# Patient Record
Sex: FEMALE | Race: WHITE | NOT HISPANIC OR LATINO | Employment: FULL TIME | URBAN - METROPOLITAN AREA
[De-identification: names, ages, dates, MRNs, and addresses within clinical notes are randomized per-mention and may not be internally consistent; named-entity substitution may affect disease eponyms.]

---

## 2023-09-11 PROBLEM — M17.9 OSTEOARTHRITIS OF KNEE: Status: ACTIVE | Noted: 2023-09-11

## 2023-09-11 PROBLEM — F41.9 ANXIETY: Status: ACTIVE | Noted: 2023-09-11

## 2023-09-11 PROBLEM — R73.03 PREDIABETES: Status: ACTIVE | Noted: 2023-09-11

## 2023-09-11 PROBLEM — E03.9 HYPOTHYROIDISM: Status: ACTIVE | Noted: 2023-09-11

## 2023-09-11 PROBLEM — E78.1 PURE HYPERGLYCERIDEMIA: Status: ACTIVE | Noted: 2023-09-11

## 2023-09-11 PROBLEM — G47.00 INSOMNIA: Status: ACTIVE | Noted: 2023-09-11

## 2023-09-11 PROBLEM — J45.909 ASTHMA (HHS-HCC): Status: ACTIVE | Noted: 2023-09-11

## 2023-09-11 PROBLEM — K21.9 GASTROESOPHAGEAL REFLUX DISEASE: Status: ACTIVE | Noted: 2023-09-11

## 2023-09-11 RX ORDER — FOLIC ACID 0.8 MG
0.8 TABLET ORAL DAILY
COMMUNITY

## 2023-09-11 RX ORDER — FAMOTIDINE 20 MG/1
20 TABLET, FILM COATED ORAL DAILY
COMMUNITY

## 2023-09-11 RX ORDER — NAPROXEN SODIUM 220 MG/1
81 TABLET, FILM COATED ORAL 2 TIMES DAILY
COMMUNITY
End: 2023-12-15 | Stop reason: WASHOUT

## 2023-09-11 RX ORDER — ZOLPIDEM TARTRATE 10 MG/1
10 TABLET ORAL NIGHTLY PRN
COMMUNITY
Start: 2023-03-30 | End: 2023-10-17 | Stop reason: SDUPTHER

## 2023-09-11 RX ORDER — FLAXSEED OIL 1000 MG
CAPSULE ORAL
COMMUNITY
End: 2024-04-04 | Stop reason: WASHOUT

## 2023-09-11 RX ORDER — FLUTICASONE FUROATE AND VILANTEROL 200; 25 UG/1; UG/1
1 POWDER RESPIRATORY (INHALATION) DAILY
COMMUNITY
Start: 2017-10-10 | End: 2024-03-11

## 2023-09-11 RX ORDER — IBUPROFEN 100 MG/5ML
1000 SUSPENSION, ORAL (FINAL DOSE FORM) ORAL DAILY
COMMUNITY

## 2023-09-11 RX ORDER — LANOLIN ALCOHOL/MO/W.PET/CERES
1000 CREAM (GRAM) TOPICAL DAILY
COMMUNITY

## 2023-09-11 RX ORDER — LEVOTHYROXINE SODIUM 75 UG/1
75 TABLET ORAL
COMMUNITY
Start: 2023-03-30 | End: 2024-03-25

## 2023-09-11 RX ORDER — ESCITALOPRAM OXALATE 20 MG/1
20 TABLET ORAL DAILY
COMMUNITY
Start: 2016-12-07 | End: 2024-04-18

## 2023-10-10 ENCOUNTER — HOSPITAL ENCOUNTER (OUTPATIENT)
Dept: RADIOLOGY | Facility: HOSPITAL | Age: 58
Discharge: HOME | End: 2023-10-10
Payer: COMMERCIAL

## 2023-10-10 ENCOUNTER — LAB (OUTPATIENT)
Dept: LAB | Facility: LAB | Age: 58
End: 2023-10-10
Payer: COMMERCIAL

## 2023-10-10 DIAGNOSIS — Z72.0 TOBACCO USE: ICD-10-CM

## 2023-10-10 DIAGNOSIS — E03.9 HYPOTHYROIDISM, UNSPECIFIED: Primary | ICD-10-CM

## 2023-10-10 LAB — TSH SERPL-ACNC: 2.83 MIU/L (ref 0.44–3.98)

## 2023-10-10 PROCEDURE — 84443 ASSAY THYROID STIM HORMONE: CPT

## 2023-10-10 PROCEDURE — 71271 CT THORAX LUNG CANCER SCR C-: CPT

## 2023-10-10 PROCEDURE — 36415 COLL VENOUS BLD VENIPUNCTURE: CPT

## 2023-10-17 ENCOUNTER — TELEPHONE (OUTPATIENT)
Dept: PRIMARY CARE | Facility: CLINIC | Age: 58
End: 2023-10-17
Payer: COMMERCIAL

## 2023-10-17 DIAGNOSIS — F51.01 PRIMARY INSOMNIA: Primary | ICD-10-CM

## 2023-10-17 RX ORDER — ZOLPIDEM TARTRATE 10 MG/1
10 TABLET ORAL NIGHTLY PRN
Qty: 30 TABLET | Refills: 0 | Status: SHIPPED | OUTPATIENT
Start: 2023-10-17 | End: 2023-11-15

## 2023-10-17 NOTE — TELEPHONE ENCOUNTER
LV 4/18/23, NV 4/4/24.  Pt requesting 90-day supply zolpidem 10 mg to [facility] on [address].  Last rf: 9/15/23, dispense 30 with 0 refills.  Has only gotten 30-day supply in past but states 90-day supply costs less.

## 2023-11-15 DIAGNOSIS — F51.01 PRIMARY INSOMNIA: ICD-10-CM

## 2023-11-15 RX ORDER — ZOLPIDEM TARTRATE 10 MG/1
10 TABLET ORAL NIGHTLY PRN
Qty: 30 TABLET | Refills: 0 | Status: SHIPPED | OUTPATIENT
Start: 2023-11-15 | End: 2023-12-18

## 2023-12-14 DIAGNOSIS — F51.01 PRIMARY INSOMNIA: ICD-10-CM

## 2023-12-15 RX ORDER — DOCUSATE SODIUM 100 MG/1
100 CAPSULE, LIQUID FILLED ORAL 2 TIMES DAILY PRN
COMMUNITY
End: 2024-04-04 | Stop reason: WASHOUT

## 2023-12-15 RX ORDER — OXYCODONE HYDROCHLORIDE 5 MG/1
5 CAPSULE ORAL EVERY 6 HOURS PRN
COMMUNITY
End: 2024-04-04 | Stop reason: WASHOUT

## 2023-12-15 RX ORDER — IBUPROFEN 200 MG
200 TABLET ORAL EVERY 6 HOURS PRN
COMMUNITY

## 2023-12-18 RX ORDER — ZOLPIDEM TARTRATE 10 MG/1
10 TABLET ORAL NIGHTLY PRN
Qty: 30 TABLET | Refills: 0 | Status: SHIPPED | OUTPATIENT
Start: 2023-12-18 | End: 2024-01-15 | Stop reason: SDUPTHER

## 2024-01-15 DIAGNOSIS — F51.01 PRIMARY INSOMNIA: ICD-10-CM

## 2024-01-15 RX ORDER — ZOLPIDEM TARTRATE 10 MG/1
10 TABLET ORAL NIGHTLY PRN
Qty: 30 TABLET | Refills: 0 | Status: SHIPPED | OUTPATIENT
Start: 2024-01-15 | End: 2024-02-14

## 2024-02-20 DIAGNOSIS — F51.01 PRIMARY INSOMNIA: ICD-10-CM

## 2024-02-21 RX ORDER — ZOLPIDEM TARTRATE 10 MG/1
10 TABLET ORAL NIGHTLY PRN
Qty: 30 TABLET | Refills: 0 | Status: SHIPPED | OUTPATIENT
Start: 2024-02-21 | End: 2024-03-21

## 2024-03-11 DIAGNOSIS — J45.20 MILD INTERMITTENT ASTHMA WITHOUT COMPLICATION (HHS-HCC): Primary | ICD-10-CM

## 2024-03-11 RX ORDER — FLUTICASONE FUROATE AND VILANTEROL TRIFENATATE 200; 25 UG/1; UG/1
1 POWDER RESPIRATORY (INHALATION) DAILY
Qty: 1 EACH | Refills: 11 | Status: SHIPPED | OUTPATIENT
Start: 2024-03-11

## 2024-03-12 ENCOUNTER — TELEPHONE (OUTPATIENT)
Dept: PRIMARY CARE | Facility: CLINIC | Age: 59
End: 2024-03-12
Payer: COMMERCIAL

## 2024-03-20 DIAGNOSIS — F51.01 PRIMARY INSOMNIA: ICD-10-CM

## 2024-03-21 RX ORDER — ZOLPIDEM TARTRATE 10 MG/1
10 TABLET ORAL NIGHTLY PRN
Qty: 30 TABLET | Refills: 0 | Status: SHIPPED | OUTPATIENT
Start: 2024-03-21 | End: 2024-04-04 | Stop reason: SDUPTHER

## 2024-03-25 DIAGNOSIS — E03.9 ACQUIRED HYPOTHYROIDISM: Primary | ICD-10-CM

## 2024-03-25 DIAGNOSIS — J45.20 MILD INTERMITTENT ASTHMA, UNSPECIFIED WHETHER COMPLICATED (HHS-HCC): ICD-10-CM

## 2024-03-25 RX ORDER — ALBUTEROL SULFATE 90 UG/1
AEROSOL, METERED RESPIRATORY (INHALATION)
Qty: 25.5 G | Refills: 2 | Status: SHIPPED | OUTPATIENT
Start: 2024-03-25

## 2024-03-25 RX ORDER — LEVOTHYROXINE SODIUM 75 UG/1
75 TABLET ORAL
Qty: 90 TABLET | Refills: 3 | Status: SHIPPED | OUTPATIENT
Start: 2024-03-25

## 2024-03-31 ASSESSMENT — PROMIS GLOBAL HEALTH SCALE
RATE_QUALITY_OF_LIFE: VERY GOOD
RATE_GENERAL_HEALTH: GOOD
CARRYOUT_SOCIAL_ACTIVITIES: VERY GOOD
EMOTIONAL_PROBLEMS: OFTEN
RATE_SOCIAL_SATISFACTION: VERY GOOD
RATE_MENTAL_HEALTH: GOOD
CARRYOUT_PHYSICAL_ACTIVITIES: MOSTLY
RATE_PHYSICAL_HEALTH: GOOD
RATE_AVERAGE_FATIGUE: MODERATE
RATE_AVERAGE_PAIN: 2

## 2024-04-04 ENCOUNTER — OFFICE VISIT (OUTPATIENT)
Dept: PRIMARY CARE | Facility: CLINIC | Age: 59
End: 2024-04-04
Payer: COMMERCIAL

## 2024-04-04 ENCOUNTER — LAB (OUTPATIENT)
Dept: LAB | Facility: LAB | Age: 59
End: 2024-04-04
Payer: COMMERCIAL

## 2024-04-04 VITALS
DIASTOLIC BLOOD PRESSURE: 60 MMHG | OXYGEN SATURATION: 96 % | TEMPERATURE: 97.7 F | BODY MASS INDEX: 29.23 KG/M2 | HEIGHT: 63 IN | HEART RATE: 59 BPM | WEIGHT: 165 LBS | SYSTOLIC BLOOD PRESSURE: 128 MMHG

## 2024-04-04 DIAGNOSIS — Z11.59 NEED FOR HEPATITIS C SCREENING TEST: ICD-10-CM

## 2024-04-04 DIAGNOSIS — Z72.0 TOBACCO USE: ICD-10-CM

## 2024-04-04 DIAGNOSIS — K21.9 GASTROESOPHAGEAL REFLUX DISEASE WITHOUT ESOPHAGITIS: ICD-10-CM

## 2024-04-04 DIAGNOSIS — F51.01 PRIMARY INSOMNIA: ICD-10-CM

## 2024-04-04 DIAGNOSIS — F13.988: ICD-10-CM

## 2024-04-04 DIAGNOSIS — E55.9 VITAMIN D DEFICIENCY: ICD-10-CM

## 2024-04-04 DIAGNOSIS — D64.9 ANEMIA, UNSPECIFIED TYPE: ICD-10-CM

## 2024-04-04 DIAGNOSIS — E78.1 PURE HYPERGLYCERIDEMIA: ICD-10-CM

## 2024-04-04 DIAGNOSIS — Z00.00 ANNUAL PHYSICAL EXAM: Primary | ICD-10-CM

## 2024-04-04 DIAGNOSIS — F41.9 ANXIETY: ICD-10-CM

## 2024-04-04 DIAGNOSIS — E03.9 ACQUIRED HYPOTHYROIDISM: ICD-10-CM

## 2024-04-04 DIAGNOSIS — Z01.89 ENCOUNTER FOR ROUTINE LABORATORY TESTING: ICD-10-CM

## 2024-04-04 DIAGNOSIS — E53.8 VITAMIN B 12 DEFICIENCY: ICD-10-CM

## 2024-04-04 DIAGNOSIS — R73.03 PREDIABETES: ICD-10-CM

## 2024-04-04 DIAGNOSIS — Z12.31 ENCOUNTER FOR SCREENING MAMMOGRAM FOR BREAST CANCER: ICD-10-CM

## 2024-04-04 DIAGNOSIS — J45.20 MILD INTERMITTENT ASTHMA WITHOUT COMPLICATION (HHS-HCC): ICD-10-CM

## 2024-04-04 LAB
AMPHETAMINES UR QL SCN: ABNORMAL
BARBITURATES UR QL SCN: ABNORMAL
BZE UR QL SCN: ABNORMAL
CANNABINOIDS UR QL SCN: ABNORMAL
CREAT UR-MCNC: 74.2 MG/DL (ref 20–320)
PCP UR QL SCN: ABNORMAL

## 2024-04-04 PROCEDURE — 80358 DRUG SCREENING METHADONE: CPT

## 2024-04-04 PROCEDURE — 80373 DRUG SCREENING TRAMADOL: CPT

## 2024-04-04 PROCEDURE — 80346 BENZODIAZEPINES1-12: CPT

## 2024-04-04 PROCEDURE — 80354 DRUG SCREENING FENTANYL: CPT

## 2024-04-04 PROCEDURE — 80307 DRUG TEST PRSMV CHEM ANLYZR: CPT

## 2024-04-04 PROCEDURE — 82570 ASSAY OF URINE CREATININE: CPT

## 2024-04-04 PROCEDURE — 80349 CANNABINOIDS NATURAL: CPT

## 2024-04-04 PROCEDURE — 80368 SEDATIVE HYPNOTICS: CPT

## 2024-04-04 PROCEDURE — 80361 OPIATES 1 OR MORE: CPT

## 2024-04-04 PROCEDURE — 80365 DRUG SCREENING OXYCODONE: CPT

## 2024-04-04 PROCEDURE — 99396 PREV VISIT EST AGE 40-64: CPT | Performed by: NURSE PRACTITIONER

## 2024-04-04 RX ORDER — ZOLPIDEM TARTRATE 10 MG/1
10 TABLET ORAL NIGHTLY PRN
Qty: 90 TABLET | Refills: 0 | Status: SHIPPED | OUTPATIENT
Start: 2024-04-04

## 2024-04-04 RX ORDER — TRAZODONE HYDROCHLORIDE 50 MG/1
50 TABLET ORAL NIGHTLY PRN
Qty: 30 TABLET | Refills: 5 | Status: SHIPPED | OUTPATIENT
Start: 2024-04-04 | End: 2025-04-04

## 2024-04-04 ASSESSMENT — ENCOUNTER SYMPTOMS
POLYDIPSIA: 0
BLOOD IN STOOL: 0
PALPITATIONS: 0
COUGH: 0
CHILLS: 0
ABDOMINAL PAIN: 0
NECK PAIN: 0
CHEST TIGHTNESS: 0
FEVER: 0
HEMATURIA: 0
DYSURIA: 0
SHORTNESS OF BREATH: 0
ADENOPATHY: 0
BACK PAIN: 0
HEADACHES: 0
DIZZINESS: 0
VOMITING: 0
CONFUSION: 0
FACIAL ASYMMETRY: 0
NAUSEA: 0
DIAPHORESIS: 0
NERVOUS/ANXIOUS: 1
FLANK PAIN: 0
WOUND: 0
FATIGUE: 0
POLYPHAGIA: 0
SPEECH DIFFICULTY: 0
AGITATION: 0
BRUISES/BLEEDS EASILY: 0
SLEEP DISTURBANCE: 1
SEIZURES: 0

## 2024-04-04 ASSESSMENT — PATIENT HEALTH QUESTIONNAIRE - PHQ9
SUM OF ALL RESPONSES TO PHQ9 QUESTIONS 1 AND 2: 0
2. FEELING DOWN, DEPRESSED OR HOPELESS: NOT AT ALL
1. LITTLE INTEREST OR PLEASURE IN DOING THINGS: NOT AT ALL

## 2024-04-04 ASSESSMENT — PAIN SCALES - GENERAL: PAINLEVEL: 0-NO PAIN

## 2024-04-04 NOTE — PROGRESS NOTES
[facility]: [facility]  PHYSICAL EXAM      Sonal Tyson is a 58 y.o. female that is presenting today for Annual Physical Exam.    Assessment/Plan   Diagnoses and all orders for this visit:    Annual physical exam    Acquired hypothyroidism        -     Clinically euthyroid        -     TSH; Future        -     Continue Levothyroxine 75 mcg daily    Gastroesophageal reflux disease without esophagitis        -     Stable on H2 Blocker        -     Continue Famotidine 20 mg daily    Mild intermittent asthma without complication       -     Stable       -     Continue current medication regimen    Anxiety       -     Reports worsening related to difficulty sleeping at night       -     Continue Escitalopram 20 mg daily    Pure hyperglyceridemia  -     Comprehensive Metabolic Panel; Future  -     Lipid Panel; Future    Vitamin B 12 deficiency  -     Vitamin B12; Future  -     Continue daily OTC Vitamin B 12 supplement    Anemia, unspecified type  -     CBC and Auto Differential; Future  -     Comprehensive Metabolic Panel; Future    Need for hepatitis C screening test  -     Hepatitis C antibody; Future    Prediabetes  -     Comprehensive Metabolic Panel; Future  -     Hemoglobin A1C; Future    Encounter for routine laboratory testing  -     CBC and Auto Differential; Future  -     Comprehensive Metabolic Panel; Future  -     Lipid Panel; Future  -     Hemoglobin A1C; Future  -     Vitamin D 25-Hydroxy,Total (for eval of Vitamin D levels); Future  -     Vitamin B12; Future    Vitamin D deficiency  -     Vitamin D 25-Hydroxy,Total (for eval of Vitamin D levels); Future    Encounter for screening mammogram for breast cancer  -     BI mammo bilateral screening tomosynthesis; Future    Primary insomnia  -     CSA signed today  -     Opiate/Opioid/Benzo Prescription Compliance; Future  -     PDMP reviewed and appropriate  -     Start traZODone (Desyrel) 50 mg tablet; Take 1 tablet (50 mg) by mouth  as needed at bedtime for sleep.  -     Continue zolpidem (Ambien) 10 mg tablet; Take 1 tablet (10 mg) by mouth as needed at bedtime for sleep.  -     Follow up in 3 months to meet CS protocol  -     If no improvement with added trazodone, will refer to sleep specialist    Sedative, hypnotic or anxiolytic use, unsp w oth disorder (CMS/HCC)  -     Opiate/Opioid/Benzo Prescription Compliance; Future    Tobacco use        -     Cessation encouraged    Other orders  -     Follow Up In Primary Care - Established; Future    Subjective   Subjective  Sonal Tyson is a 58 y.o. female who presents for follow up of hypothyroidism. Current symptoms: none. Patient denies change in energy level, diarrhea, heat / cold intolerance, nervousness, palpitations, and weight changes. Symptoms have stabilized and been well-controlled.    [unfilled]     Objective  [unfilled]    Laboratory:  Lab Results       Component                Value               Date                       TSH                      2.83                10/10/2023              Assessment/Plan  Hypothyroidism.  Replacement is Levothyroxine 75 mcg daily    1. L-thyroxine per orders.  2. Recheck thyroid function tests today  3. Instructed not to take multivitamins or iron within 4 hours of taking thyroid medications.  4. Follow up in 1 year.    Vipul Tyson is an 58 y.o. female who presents for evaluation of heartburn. She denies cough, dysphagia, heartburn, hematemesis, hoarseness, midespigastric pain, and nausea.  She denies dysphagia. She has not lost weight. She denies melena, hematochezia, hematemesis, and coffee ground emesis.    Objective  [unfilled]     Assessment/Plan  Gastroesophageal Reflux Disease, Stable  Will continue H2 Blocker daily  Follow up in 3 months or sooner as needed.    Vipul Tyson is an 58 y.o. female who presents for follow up of asthma. The patient is not currently having symptoms / an exacerbation. The  "patient has been having episodes for entire life. Symptoms in previous episodes have included dyspnea, non-productive cough, and wheezing, and typically last 2 minutes. Previous episodes have been triggered by dust, infection, pollens, smoke, and strong odors. Treatments tried during prior episodes include short-acting inhaled beta-adrenergic agonists, which usually provides complete resolution of symptoms.    Objective  /60   Pulse 59   Temp 36.5 °C (97.7 °F)   Ht 1.588 m (5' 2.52\")   Wt 74.8 kg (165 lb)   SpO2 96%   BMI 29.68 kg/m²   [unfilled]     Assessment/Plan    Subjective  Sonal Tyson is a 58 y.o. female who complains of insomnia. Patient describes symptoms as difficulty falling asleep. Patient has found minimal relief with prescription sleep aid. Associated symptoms include: anxiety. Patient denies frequent nighttime urination, irritability, leg cramps, and restless legs. Symptoms have gradually worsened.    Objective  [unfilled]     Assessment/Plan  Insomnia associated with generalized anxiety.  Discussed sleep hygiene measures including regular sleep schedule, optimal sleep environment, and relaxing presleep rituals.  Avoid daytime naps.  Avoid caffeine after noon.  Avoid excess alcohol.  Avoid tobacco.  Recommended daily exercise.    Subjective  Sonal Tyson is a 58 y.o. female who presents for follow up of anxiety disorder. Current symptoms: insomnia, racing thoughts just prior to bedtime. She denies current suicidal and homicidal ideation. She complains of the following side effects from the treatment: none.     Objective  /60   Pulse 59   Temp 36.5 °C (97.7 °F)   Ht 1.588 m (5' 2.52\")   Wt 74.8 kg (165 lb)   SpO2 96%   BMI 29.68 kg/m²    General:    alert and oriented, in no acute distress  Affect/Behavior:     full facial expressions, good grooming, good insight, normal perception, normal reasoning, normal speech pattern and content, and normal thought patterns "     Assessment/Plan  Anxiety Disorder - worsening  Medications: Lexapro - add Trazodone to aid in sleep, if ineffective, will refer to sleep specialist.  Instructed patient to contact office or on-call physician promptly should condition worsen or any new symptoms appear and provided on-call telephone numbers. IF THE PATIENT HAS ANY SUICIDAL OR HOMICIDAL IDEATIONS, CALL THE OFFICE, DISCUSS WITH A SUPPORT MEMBER, OR GO TO THE ER IMMEDIATELY. Patient was agreeable with this plan.  Follow up: 3 months.          Review of Systems   Constitutional:  Negative for chills, diaphoresis, fatigue and fever.   HENT:  Negative for hearing loss and mouth sores.    Eyes:  Negative for visual disturbance.   Respiratory:  Negative for cough, chest tightness and shortness of breath.    Cardiovascular:  Negative for chest pain, palpitations and leg swelling.   Gastrointestinal:  Negative for abdominal pain, blood in stool, nausea and vomiting.   Endocrine: Negative for cold intolerance, heat intolerance, polydipsia, polyphagia and polyuria.   Genitourinary:  Negative for dysuria, flank pain and hematuria.   Musculoskeletal:  Negative for back pain and neck pain.   Skin:  Negative for rash and wound.   Allergic/Immunologic: Positive for environmental allergies. Negative for food allergies and immunocompromised state.   Neurological:  Negative for dizziness, seizures, syncope, facial asymmetry, speech difficulty and headaches.   Hematological:  Negative for adenopathy. Does not bruise/bleed easily.   Psychiatric/Behavioral:  Positive for sleep disturbance. Negative for agitation and confusion. The patient is nervous/anxious.       Objective   Vitals:    04/04/24 0804   BP: 128/60   Pulse: 59   Temp: 36.5 °C (97.7 °F)   SpO2: 96%     Body mass index is 29.68 kg/m².  Physical Exam  Vitals and nursing note reviewed.   Constitutional:       General: She is not in acute distress.     Appearance: Normal appearance. She is not ill-appearing.    HENT:      Head: Normocephalic and atraumatic.      Right Ear: Tympanic membrane, ear canal and external ear normal. There is no impacted cerumen.      Left Ear: Tympanic membrane, ear canal and external ear normal. There is no impacted cerumen.      Nose: Nose normal.      Mouth/Throat:      Mouth: Mucous membranes are moist.      Pharynx: Oropharynx is clear. No oropharyngeal exudate or posterior oropharyngeal erythema.   Eyes:      General: No scleral icterus.        Right eye: No discharge.         Left eye: No discharge.      Extraocular Movements: Extraocular movements intact.      Conjunctiva/sclera: Conjunctivae normal.      Pupils: Pupils are equal, round, and reactive to light.   Neck:      Vascular: No carotid bruit.   Cardiovascular:      Rate and Rhythm: Normal rate and regular rhythm.      Pulses: Normal pulses.      Heart sounds: Normal heart sounds. No murmur heard.  Pulmonary:      Effort: Pulmonary effort is normal. No respiratory distress.      Breath sounds: Normal breath sounds.   Abdominal:      General: Abdomen is flat. Bowel sounds are normal. There is no distension.      Palpations: Abdomen is soft. There is no mass.      Tenderness: There is no abdominal tenderness. There is no right CVA tenderness or left CVA tenderness.      Hernia: No hernia is present.   Musculoskeletal:         General: No tenderness. Normal range of motion.      Cervical back: No tenderness.      Right lower leg: No edema.      Left lower leg: No edema.   Lymphadenopathy:      Cervical: No cervical adenopathy.   Skin:     General: Skin is warm and dry.      Capillary Refill: Capillary refill takes less than 2 seconds.      Coloration: Skin is not jaundiced.      Findings: No rash.   Neurological:      General: No focal deficit present.      Mental Status: She is alert and oriented to person, place, and time. Mental status is at baseline.   Psychiatric:         Mood and Affect: Mood normal.         Behavior: Behavior  "normal.       Diagnostic Results   Lab Results   Component Value Date    GLUCOSE 144 (H) 05/10/2023    CALCIUM 8.6 05/10/2023     05/10/2023    K 4.2 05/10/2023    CO2 24 05/10/2023     05/10/2023    BUN 12 05/10/2023    CREATININE 0.7 05/10/2023     Lab Results   Component Value Date    ALT 22 03/14/2023    AST 23 03/14/2023    ALKPHOS 88 03/14/2023    BILITOT 0.3 03/14/2023     Lab Results   Component Value Date    WBC 9.9 05/10/2023    HGB 13.5 05/10/2023    HCT 40.8 05/10/2023    MCV 96.2 05/10/2023     (L) 05/10/2023     Lab Results   Component Value Date    CHOL 192 03/14/2023    CHOL 185 03/24/2022    CHOL 208 (H) 12/29/2020     Lab Results   Component Value Date    HDL 37 (L) 03/14/2023    HDL 41 (L) 03/24/2022    HDL 36 (L) 12/29/2020     Lab Results   Component Value Date    LDLCALC 99 03/14/2023    LDLCALC 105 03/24/2022    LDLCALC  12/29/2020     Unable to report calculated LDL due to increased triglycerides. Direct     Lab Results   Component Value Date    TRIG 282 (H) 03/14/2023    TRIG 194 (H) 03/24/2022    TRIG 499 (H) 12/29/2020     No components found for: \"CHOLHDL\"  Lab Results   Component Value Date    HGBA1C 5.7 03/14/2023     Other labs not included in the list above were reviewed either before or during this encounter.    History   History reviewed. No pertinent past medical history.  History reviewed. No pertinent surgical history.  Family History   Problem Relation Name Age of Onset    Melanoma Mother      Heart disease Mother      Other (Heart Stent) Mother      Other (Triple Bypass) Brother  38    Melanoma Maternal Grandfather      Colon cancer Paternal Grandmother       Social History     Socioeconomic History    Marital status:      Spouse name: Not on file    Number of children: Not on file    Years of education: Not on file    Highest education level: Not on file   Occupational History    Not on file   Tobacco Use    Smoking status: Every Day     Packs/day: 1 "     Types: Cigarettes    Smokeless tobacco: Never   Vaping Use    Vaping Use: Never used   Substance and Sexual Activity    Alcohol use: Not Currently    Drug use: Never    Sexual activity: Not on file   Other Topics Concern    Not on file   Social History Narrative    Not on file     Social Determinants of Health     Financial Resource Strain: Not on file   Food Insecurity: Not on file   Transportation Needs: Not on file   Physical Activity: Not on file   Stress: Not on file   Social Connections: Not on file   Intimate Partner Violence: Not on file   Housing Stability: Not on file     Allergies   Allergen Reactions    Penicillin Itching     Current Outpatient Medications on File Prior to Visit   Medication Sig Dispense Refill    albuterol 90 mcg/actuation inhaler INHALE 2 PUFFS BY MOUTH AS NEEDED EVERY FOUR HOURS 25.5 g 2    ascorbic acid (Vitamin C) 1,000 mg tablet Take 1 tablet (1,000 mg) by mouth once daily.      cyanocobalamin (Vitamin B-12) 1,000 mcg tablet Take 1 tablet (1,000 mcg) by mouth once daily.      escitalopram (Lexapro) 20 mg tablet Take 1 tablet (20 mg) by mouth once daily.      famotidine (Pepcid) 20 mg tablet Take 1 tablet (20 mg) by mouth once daily.      ferrous sulfate (IRON ORAL) Take 1 tablet by mouth once daily.      fluticasone furoate-vilanteroL (Breo Ellipta) 200-25 mcg/dose inhaler INHALE ONE PUFF BY MOUTH ONCE DAILY 1 each 11    folic acid (Folvite) 800 mcg tablet Take 1 tablet (0.8 mg) by mouth once daily.      ibuprofen 200 mg tablet Take 1 tablet (200 mg) by mouth every 6 hours if needed for mild pain (1 - 3).      levothyroxine (Synthroid, Levoxyl) 75 mcg tablet take 1 tablet by mouth in the morning on an empty stomach 90 tablet 3    loratadine 10 mg capsule Take 1 tablet by mouth once daily.      zolpidem (Ambien) 10 mg tablet take 1 tablet by mouth every day at bedtime as needed for sleep 30 tablet 0    [DISCONTINUED] docusate sodium (Colace) 100 mg capsule Take 1 capsule (100  mg) by mouth 2 times a day as needed for constipation.      [DISCONTINUED] flaxseed oiL 1,000 mg capsule Take by mouth.      [DISCONTINUED] oxyCODONE (Oxy-IR) 5 mg immediate release capsule Take 1 capsule (5 mg) by mouth every 6 hours if needed for severe pain (7 - 10).       No current facility-administered medications on file prior to visit.     Immunization History   Administered Date(s) Administered    Flu vaccine (IIV4), preservative free *Check age/dose* 09/30/2020    Flu vaccine, quadrivalent, no egg protein, age 6 month or greater (FLUCELVAX) 10/19/2019, 11/17/2022    Influenza, injectable, quadrivalent 11/16/2015, 12/07/2016, 10/01/2019, 10/26/2021    Influenza, seasonal, injectable 10/05/2021, 10/31/2023    Moderna COVID-19 vaccine, bivalent, blue cap/gray label *Check age/dose* 11/17/2022    Moderna SARS-CoV-2 Vaccination 05/10/2021, 06/04/2021, 01/03/2022    Pfizer Purple Cap SARS-CoV-2 10/31/2023    Tdap vaccine, age 7 year and older (BOOSTRIX, ADACEL) 04/24/2014, 10/17/2019    Zoster vaccine, recombinant, adult (SHINGRIX) 04/02/2021     Patient's medical history was reviewed and updated either before or during this encounter.       Yvrose Doe, APRN-CNP

## 2024-04-08 LAB — CARBOXYTHC UR-MCNC: 218 NG/ML

## 2024-04-09 LAB
1OH-MIDAZOLAM UR CFM-MCNC: <25 NG/ML
6MAM UR CFM-MCNC: <25 NG/ML
7AMINOCLONAZEPAM UR CFM-MCNC: <25 NG/ML
A-OH ALPRAZ UR CFM-MCNC: <25 NG/ML
ALPRAZ UR CFM-MCNC: <25 NG/ML
CHLORDIAZEP UR CFM-MCNC: <25 NG/ML
CLONAZEPAM UR CFM-MCNC: <25 NG/ML
CODEINE UR CFM-MCNC: <50 NG/ML
DIAZEPAM UR CFM-MCNC: <25 NG/ML
EDDP UR CFM-MCNC: <25 NG/ML
FENTANYL UR CFM-MCNC: <2.5 NG/ML
HYDROCODONE CTO UR CFM-MCNC: <25 NG/ML
HYDROMORPHONE UR CFM-MCNC: <25 NG/ML
LORAZEPAM UR CFM-MCNC: <25 NG/ML
METHADONE UR CFM-MCNC: <25 NG/ML
MIDAZOLAM UR CFM-MCNC: <25 NG/ML
MORPHINE UR CFM-MCNC: <50 NG/ML
NORDIAZEPAM UR CFM-MCNC: <25 NG/ML
NORFENTANYL UR CFM-MCNC: <2.5 NG/ML
NORHYDROCODONE UR CFM-MCNC: <25 NG/ML
NOROXYCODONE UR CFM-MCNC: <25 NG/ML
NORTRAMADOL UR-MCNC: <50 NG/ML
OXAZEPAM UR CFM-MCNC: <25 NG/ML
OXYCODONE UR CFM-MCNC: <25 NG/ML
OXYMORPHONE UR CFM-MCNC: <25 NG/ML
TEMAZEPAM UR CFM-MCNC: <25 NG/ML
TRAMADOL UR CFM-MCNC: <50 NG/ML
ZOLPIDEM UR CFM-MCNC: >1000 NG/ML
ZOLPIDEM UR-MCNC: <25 NG/ML

## 2024-04-17 ENCOUNTER — APPOINTMENT (OUTPATIENT)
Dept: RADIOLOGY | Facility: HOSPITAL | Age: 59
End: 2024-04-17
Payer: COMMERCIAL

## 2024-04-18 DIAGNOSIS — F41.9 ANXIETY: Primary | ICD-10-CM

## 2024-04-18 RX ORDER — ESCITALOPRAM OXALATE 20 MG/1
20 TABLET ORAL DAILY
Qty: 90 TABLET | Refills: 3 | Status: SHIPPED | OUTPATIENT
Start: 2024-04-18

## 2024-04-19 ENCOUNTER — HOSPITAL ENCOUNTER (OUTPATIENT)
Dept: RADIOLOGY | Facility: HOSPITAL | Age: 59
Discharge: HOME | End: 2024-04-19
Payer: COMMERCIAL

## 2024-04-19 VITALS — WEIGHT: 160 LBS | HEIGHT: 64 IN | BODY MASS INDEX: 27.31 KG/M2

## 2024-04-19 DIAGNOSIS — Z12.31 ENCOUNTER FOR SCREENING MAMMOGRAM FOR BREAST CANCER: ICD-10-CM

## 2024-04-19 PROCEDURE — 77063 BREAST TOMOSYNTHESIS BI: CPT | Performed by: STUDENT IN AN ORGANIZED HEALTH CARE EDUCATION/TRAINING PROGRAM

## 2024-04-19 PROCEDURE — 77067 SCR MAMMO BI INCL CAD: CPT | Performed by: STUDENT IN AN ORGANIZED HEALTH CARE EDUCATION/TRAINING PROGRAM

## 2024-04-19 PROCEDURE — 77067 SCR MAMMO BI INCL CAD: CPT

## 2024-04-24 NOTE — RESULT ENCOUNTER NOTE
Left voicemail for pt, pt advised to call office or scheduling if she has not been contacted for additional imaging to complete her mammogram exam, scheduling number provided.

## 2024-05-13 ENCOUNTER — APPOINTMENT (OUTPATIENT)
Dept: RADIOLOGY | Facility: HOSPITAL | Age: 59
End: 2024-05-13
Payer: COMMERCIAL

## 2024-05-20 ENCOUNTER — APPOINTMENT (OUTPATIENT)
Dept: RADIOLOGY | Facility: HOSPITAL | Age: 59
End: 2024-05-20
Payer: COMMERCIAL

## 2024-05-20 ENCOUNTER — HOSPITAL ENCOUNTER (OUTPATIENT)
Dept: RADIOLOGY | Facility: HOSPITAL | Age: 59
Discharge: HOME | End: 2024-05-20
Payer: COMMERCIAL

## 2024-05-20 ENCOUNTER — LAB (OUTPATIENT)
Dept: LAB | Facility: LAB | Age: 59
End: 2024-05-20
Payer: COMMERCIAL

## 2024-05-20 DIAGNOSIS — E78.1 PURE HYPERGLYCERIDEMIA: ICD-10-CM

## 2024-05-20 DIAGNOSIS — E03.9 ACQUIRED HYPOTHYROIDISM: ICD-10-CM

## 2024-05-20 DIAGNOSIS — E55.9 VITAMIN D DEFICIENCY: ICD-10-CM

## 2024-05-20 DIAGNOSIS — D64.9 ANEMIA, UNSPECIFIED TYPE: ICD-10-CM

## 2024-05-20 DIAGNOSIS — E53.8 VITAMIN B 12 DEFICIENCY: ICD-10-CM

## 2024-05-20 DIAGNOSIS — R73.03 PREDIABETES: ICD-10-CM

## 2024-05-20 DIAGNOSIS — R92.8 OTHER ABNORMAL AND INCONCLUSIVE FINDINGS ON DIAGNOSTIC IMAGING OF BREAST: ICD-10-CM

## 2024-05-20 DIAGNOSIS — Z11.59 NEED FOR HEPATITIS C SCREENING TEST: ICD-10-CM

## 2024-05-20 DIAGNOSIS — Z01.89 ENCOUNTER FOR ROUTINE LABORATORY TESTING: ICD-10-CM

## 2024-05-20 LAB
25(OH)D3 SERPL-MCNC: 87 NG/ML (ref 30–100)
ALBUMIN SERPL BCP-MCNC: 4.1 G/DL (ref 3.4–5)
ALP SERPL-CCNC: 87 U/L (ref 33–110)
ALT SERPL W P-5'-P-CCNC: 21 U/L (ref 7–45)
ANION GAP SERPL CALC-SCNC: 12 MMOL/L (ref 10–20)
AST SERPL W P-5'-P-CCNC: 25 U/L (ref 9–39)
BASOPHILS # BLD AUTO: 0.02 X10*3/UL (ref 0–0.1)
BASOPHILS NFR BLD AUTO: 0.4 %
BILIRUB SERPL-MCNC: 0.5 MG/DL (ref 0–1.2)
BUN SERPL-MCNC: 12 MG/DL (ref 6–23)
CALCIUM SERPL-MCNC: 9.3 MG/DL (ref 8.6–10.3)
CHLORIDE SERPL-SCNC: 105 MMOL/L (ref 98–107)
CHOLEST SERPL-MCNC: 154 MG/DL (ref 0–199)
CHOLESTEROL/HDL RATIO: 3.6
CO2 SERPL-SCNC: 28 MMOL/L (ref 21–32)
CREAT SERPL-MCNC: 0.63 MG/DL (ref 0.5–1.05)
EGFRCR SERPLBLD CKD-EPI 2021: >90 ML/MIN/1.73M*2
EOSINOPHIL # BLD AUTO: 0.36 X10*3/UL (ref 0–0.7)
EOSINOPHIL NFR BLD AUTO: 6.8 %
ERYTHROCYTE [DISTWIDTH] IN BLOOD BY AUTOMATED COUNT: 13.9 % (ref 11.5–14.5)
EST. AVERAGE GLUCOSE BLD GHB EST-MCNC: 111 MG/DL
GLUCOSE SERPL-MCNC: 96 MG/DL (ref 74–99)
HBA1C MFR BLD: 5.5 %
HCT VFR BLD AUTO: 47.3 % (ref 36–46)
HCV AB SER QL: NONREACTIVE
HDLC SERPL-MCNC: 42.2 MG/DL
HGB BLD-MCNC: 15.3 G/DL (ref 12–16)
IMM GRANULOCYTES # BLD AUTO: 0.01 X10*3/UL (ref 0–0.7)
IMM GRANULOCYTES NFR BLD AUTO: 0.2 % (ref 0–0.9)
LDLC SERPL CALC-MCNC: 89 MG/DL
LYMPHOCYTES # BLD AUTO: 1.61 X10*3/UL (ref 1.2–4.8)
LYMPHOCYTES NFR BLD AUTO: 30.4 %
MCH RBC QN AUTO: 31.2 PG (ref 26–34)
MCHC RBC AUTO-ENTMCNC: 32.3 G/DL (ref 32–36)
MCV RBC AUTO: 96 FL (ref 80–100)
MONOCYTES # BLD AUTO: 0.43 X10*3/UL (ref 0.1–1)
MONOCYTES NFR BLD AUTO: 8.1 %
NEUTROPHILS # BLD AUTO: 2.86 X10*3/UL (ref 1.2–7.7)
NEUTROPHILS NFR BLD AUTO: 54.1 %
NON HDL CHOLESTEROL: 112 MG/DL (ref 0–149)
NRBC BLD-RTO: 0 /100 WBCS (ref 0–0)
PLATELET # BLD AUTO: 158 X10*3/UL (ref 150–450)
POTASSIUM SERPL-SCNC: 3.4 MMOL/L (ref 3.5–5.3)
PROT SERPL-MCNC: 7.2 G/DL (ref 6.4–8.2)
RBC # BLD AUTO: 4.91 X10*6/UL (ref 4–5.2)
SODIUM SERPL-SCNC: 142 MMOL/L (ref 136–145)
TRIGL SERPL-MCNC: 116 MG/DL (ref 0–149)
TSH SERPL-ACNC: 2.02 MIU/L (ref 0.44–3.98)
VIT B12 SERPL-MCNC: 710 PG/ML (ref 211–911)
VLDL: 23 MG/DL (ref 0–40)
WBC # BLD AUTO: 5.3 X10*3/UL (ref 4.4–11.3)

## 2024-05-20 PROCEDURE — 86803 HEPATITIS C AB TEST: CPT

## 2024-05-20 PROCEDURE — 77061 BREAST TOMOSYNTHESIS UNI: CPT | Mod: LEFT SIDE | Performed by: RADIOLOGY

## 2024-05-20 PROCEDURE — 83036 HEMOGLOBIN GLYCOSYLATED A1C: CPT

## 2024-05-20 PROCEDURE — 82607 VITAMIN B-12: CPT

## 2024-05-20 PROCEDURE — 36415 COLL VENOUS BLD VENIPUNCTURE: CPT

## 2024-05-20 PROCEDURE — 77065 DX MAMMO INCL CAD UNI: CPT | Mod: LEFT SIDE | Performed by: RADIOLOGY

## 2024-05-20 PROCEDURE — 84443 ASSAY THYROID STIM HORMONE: CPT

## 2024-05-20 PROCEDURE — 85025 COMPLETE CBC W/AUTO DIFF WBC: CPT

## 2024-05-20 PROCEDURE — 80053 COMPREHEN METABOLIC PANEL: CPT

## 2024-05-20 PROCEDURE — 82306 VITAMIN D 25 HYDROXY: CPT

## 2024-05-20 PROCEDURE — 80061 LIPID PANEL: CPT

## 2024-05-20 PROCEDURE — 77061 BREAST TOMOSYNTHESIS UNI: CPT | Mod: LT

## 2024-05-23 ENCOUNTER — APPOINTMENT (OUTPATIENT)
Dept: RADIOLOGY | Facility: HOSPITAL | Age: 59
End: 2024-05-23
Payer: COMMERCIAL

## 2024-07-03 ENCOUNTER — OFFICE VISIT (OUTPATIENT)
Dept: PRIMARY CARE | Facility: CLINIC | Age: 59
End: 2024-07-03
Payer: COMMERCIAL

## 2024-07-03 VITALS
DIASTOLIC BLOOD PRESSURE: 86 MMHG | HEIGHT: 64 IN | HEART RATE: 57 BPM | BODY MASS INDEX: 27.14 KG/M2 | WEIGHT: 159 LBS | SYSTOLIC BLOOD PRESSURE: 136 MMHG | TEMPERATURE: 97.5 F | OXYGEN SATURATION: 98 %

## 2024-07-03 DIAGNOSIS — L98.8: ICD-10-CM

## 2024-07-03 DIAGNOSIS — F51.01 PRIMARY INSOMNIA: Primary | ICD-10-CM

## 2024-07-03 DIAGNOSIS — J45.20 MILD INTERMITTENT ASTHMA WITHOUT COMPLICATION (HHS-HCC): ICD-10-CM

## 2024-07-03 PROCEDURE — 99214 OFFICE O/P EST MOD 30 MIN: CPT | Performed by: NURSE PRACTITIONER

## 2024-07-03 RX ORDER — FLUTICASONE FUROATE AND VILANTEROL 200; 25 UG/1; UG/1
1 POWDER RESPIRATORY (INHALATION) DAILY
Qty: 3 EACH | Refills: 11 | Status: SHIPPED | OUTPATIENT
Start: 2024-07-03

## 2024-07-03 RX ORDER — ZOLPIDEM TARTRATE 10 MG/1
10 TABLET ORAL NIGHTLY PRN
Qty: 90 TABLET | Refills: 0 | Status: SHIPPED | OUTPATIENT
Start: 2024-07-03

## 2024-07-03 ASSESSMENT — ENCOUNTER SYMPTOMS
FEVER: 0
DIAPHORESIS: 0
DYSPHORIC MOOD: 0
NAUSEA: 0
OCCASIONAL FEELINGS OF UNSTEADINESS: 0
CHILLS: 0
VOMITING: 0
DEPRESSION: 0
LOSS OF SENSATION IN FEET: 0
SLEEP DISTURBANCE: 1
FATIGUE: 0
AGITATION: 0
CONFUSION: 0

## 2024-07-03 ASSESSMENT — LIFESTYLE VARIABLES
HOW OFTEN DURING THE LAST YEAR HAVE YOU NEEDED AN ALCOHOLIC DRINK FIRST THING IN THE MORNING TO GET YOURSELF GOING AFTER A NIGHT OF HEAVY DRINKING: NEVER
HOW OFTEN DURING THE LAST YEAR HAVE YOU HAD A FEELING OF GUILT OR REMORSE AFTER DRINKING: NEVER
HAS A RELATIVE, FRIEND, DOCTOR, OR ANOTHER HEALTH PROFESSIONAL EXPRESSED CONCERN ABOUT YOUR DRINKING OR SUGGESTED YOU CUT DOWN: NO
HAVE YOU OR SOMEONE ELSE BEEN INJURED AS A RESULT OF YOUR DRINKING: NO
AUDIT-C TOTAL SCORE: 0
HOW OFTEN DURING THE LAST YEAR HAVE YOU FAILED TO DO WHAT WAS NORMALLY EXPECTED FROM YOU BECAUSE OF DRINKING: NEVER
AUDIT TOTAL SCORE: 0
HOW OFTEN DURING THE LAST YEAR HAVE YOU FOUND THAT YOU WERE NOT ABLE TO STOP DRINKING ONCE YOU HAD STARTED: NEVER
HOW OFTEN DO YOU HAVE SIX OR MORE DRINKS ON ONE OCCASION: NEVER
HOW OFTEN DO YOU HAVE A DRINK CONTAINING ALCOHOL: NEVER
HOW OFTEN DURING THE LAST YEAR HAVE YOU BEEN UNABLE TO REMEMBER WHAT HAPPENED THE NIGHT BEFORE BECAUSE YOU HAD BEEN DRINKING: NEVER
HOW MANY STANDARD DRINKS CONTAINING ALCOHOL DO YOU HAVE ON A TYPICAL DAY: PATIENT DOES NOT DRINK
SKIP TO QUESTIONS 9-10: 1

## 2024-07-03 ASSESSMENT — PATIENT HEALTH QUESTIONNAIRE - PHQ9
1. LITTLE INTEREST OR PLEASURE IN DOING THINGS: NOT AT ALL
2. FEELING DOWN, DEPRESSED OR HOPELESS: NOT AT ALL
SUM OF ALL RESPONSES TO PHQ9 QUESTIONS 1 AND 2: 0

## 2024-07-03 ASSESSMENT — PAIN SCALES - GENERAL: PAINLEVEL: 0-NO PAIN

## 2024-07-03 NOTE — TELEPHONE ENCOUNTER
----- Message from Sonal Tyson sent at 7/3/2024 11:39 AM EDT -----  Regarding:  CHERI TANNER   Contact: [phone]  Gurwinder Frances,  Would you please send in a new prescription that indicates 90 day supply?   $35 for 30 day, $70 for 90 day supply.   Thank you!

## 2024-07-03 NOTE — PROGRESS NOTES
[facility]: [facility]  PROGRESS NOTE      Sonal Tyson is a 59 y.o. female that is presenting today for Controlled Substance Protocol.    Ms. Tyson is currently using Zolpidem Tartrate 10 mg nightly to manage insomnia. She reports efficacy consistent.  PDMP reviewed and appropriate. Last fill on 04/20/24 for 90 days supply. CSA signed 04/04/24. Urine toxicology passed 04/04/24.    She did not find Trazodone helpful. She stopped taking it. She provided data from her Apple Watch that details her sleep patterns. She never seems to go into deep sleep, REM sleep is minimal.  She does wake a few times nightly for a short period.  We discussed the abnormal patterns and will refer patient to sleep specialist, Dr. Whalen, to further evaluate and potentially manage her insomnia.    Assessment/Plan   Diagnoses and all orders for this visit:    Primary insomnia  -     CSA and Urine toxicology up to date  -     PDMP reviewed and appropriate  -     zolpidem (Ambien) 10 mg tablet; Take 1 tablet (10 mg) by mouth as needed at bedtime for sleep.  -     Referral to Adult Sleep Medicine; Future - Dr. Whalen  -     Follow up in 3 months to meet [facility] protocol    Vascular skin disease  -     Referral to Vascular Medicine; Future - DERRICK Zhang    Other orders  -     Follow Up In Primary Care - Established  -     Follow Up In Primary Care - Established; Future    Subjective   HPI  Review of Systems   Constitutional:  Negative for chills, diaphoresis, fatigue and fever.   Gastrointestinal:  Negative for nausea and vomiting.   Psychiatric/Behavioral:  Positive for sleep disturbance. Negative for agitation, confusion and dysphoric mood.       Objective   Vitals:    07/03/24 0830   BP: 136/86   Pulse: 57   Temp: 36.4 °C (97.5 °F)   SpO2: 98%      Body mass index is 27.29 kg/m².  Physical Exam  Vitals and nursing note reviewed.   Constitutional:       General: She is not in acute  "distress.  Cardiovascular:      Rate and Rhythm: Normal rate and regular rhythm.      Heart sounds: Normal heart sounds.   Pulmonary:      Effort: Pulmonary effort is normal.      Breath sounds: Normal breath sounds.   Skin:     General: Skin is warm and dry.      Comments: Vascular prominence to right lateral thigh and left anterior thigh, not painful.   Neurological:      Mental Status: She is alert. Mental status is at baseline.   Psychiatric:         Mood and Affect: Mood normal.       Diagnostic Results   Lab Results   Component Value Date    GLUCOSE 96 05/20/2024    CALCIUM 9.3 05/20/2024     05/20/2024    K 3.4 (L) 05/20/2024    CO2 28 05/20/2024     05/20/2024    BUN 12 05/20/2024    CREATININE 0.63 05/20/2024     Lab Results   Component Value Date    ALT 21 05/20/2024    AST 25 05/20/2024    ALKPHOS 87 05/20/2024    BILITOT 0.5 05/20/2024     Lab Results   Component Value Date    WBC 5.3 05/20/2024    HGB 15.3 05/20/2024    HCT 47.3 (H) 05/20/2024    MCV 96 05/20/2024     05/20/2024     Lab Results   Component Value Date    CHOL 154 05/20/2024    CHOL 192 03/14/2023    CHOL 185 03/24/2022     Lab Results   Component Value Date    HDL 42.2 05/20/2024    HDL 37 (L) 03/14/2023    HDL 41 (L) 03/24/2022     Lab Results   Component Value Date    LDLCALC 89 05/20/2024    LDLCALC 99 03/14/2023    LDLCALC 105 03/24/2022     Lab Results   Component Value Date    TRIG 116 05/20/2024    TRIG 282 (H) 03/14/2023    TRIG 194 (H) 03/24/2022     No components found for: \"CHOLHDL\"  Lab Results   Component Value Date    HGBA1C 5.5 05/20/2024     Other labs not included in the list above were reviewed either before or during this encounter.    History    History reviewed. No pertinent past medical history.  History reviewed. No pertinent surgical history.  Family History   Problem Relation Name Age of Onset    Melanoma Mother      Heart disease Mother      Other (Heart Stent) Mother      Other (Triple " Bypass) Brother  38    Melanoma Maternal Grandfather      Colon cancer Paternal Grandmother       Social History     Socioeconomic History    Marital status:      Spouse name: Not on file    Number of children: Not on file    Years of education: Not on file    Highest education level: Not on file   Occupational History    Not on file   Tobacco Use    Smoking status: Every Day     Current packs/day: 1.00     Types: Cigarettes    Smokeless tobacco: Never   Vaping Use    Vaping status: Never Used   Substance and Sexual Activity    Alcohol use: Not Currently    Drug use: Never    Sexual activity: Not on file   Other Topics Concern    Not on file   Social History Narrative    Not on file     Social Determinants of Health     Financial Resource Strain: Not on file   Food Insecurity: Not on file   Transportation Needs: Not on file   Physical Activity: Not on file   Stress: Not on file   Social Connections: Not on file   Intimate Partner Violence: Not on file   Housing Stability: Not on file     Allergies   Allergen Reactions    Penicillin Itching     Current Outpatient Medications on File Prior to Visit   Medication Sig Dispense Refill    albuterol 90 mcg/actuation inhaler INHALE 2 PUFFS BY MOUTH AS NEEDED EVERY FOUR HOURS 25.5 g 2    ascorbic acid (Vitamin C) 1,000 mg tablet Take 1 tablet (1,000 mg) by mouth once daily.      cyanocobalamin (Vitamin B-12) 1,000 mcg tablet Take 1 tablet (1,000 mcg) by mouth once daily.      escitalopram (Lexapro) 20 mg tablet take 1 tablet by mouth once a day 90 tablet 3    famotidine (Pepcid) 20 mg tablet Take 1 tablet (20 mg) by mouth once daily.      ferrous sulfate (IRON ORAL) Take 1 tablet by mouth once daily.      fluticasone furoate-vilanteroL (Breo Ellipta) 200-25 mcg/dose inhaler INHALE ONE PUFF BY MOUTH ONCE DAILY 1 each 11    folic acid (Folvite) 800 mcg tablet Take 1 tablet (0.8 mg) by mouth once daily.      ibuprofen 200 mg tablet Take 1 tablet (200 mg) by mouth every 6  hours if needed for mild pain (1 - 3).      levothyroxine (Synthroid, Levoxyl) 75 mcg tablet take 1 tablet by mouth in the morning on an empty stomach 90 tablet 3    loratadine 10 mg capsule Take 1 tablet by mouth once daily.      zolpidem (Ambien) 10 mg tablet Take 1 tablet (10 mg) by mouth as needed at bedtime for sleep. 90 tablet 0    [DISCONTINUED] traZODone (Desyrel) 50 mg tablet Take 1 tablet (50 mg) by mouth as needed at bedtime for sleep. 30 tablet 5     No current facility-administered medications on file prior to visit.     Immunization History   Administered Date(s) Administered    Flu vaccine (IIV4), preservative free *Check age/dose* 09/30/2020    Flu vaccine, quadrivalent, no egg protein, age 6 month or greater (FLUCELVAX) 10/19/2019, 11/17/2022    Influenza, injectable, quadrivalent 11/16/2015, 12/07/2016, 10/01/2019, 10/26/2021    Influenza, seasonal, injectable 10/05/2021, 10/31/2023    Moderna COVID-19 vaccine, bivalent, blue cap/gray label *Check age/dose* 11/17/2022    Moderna SARS-CoV-2 Vaccination 05/10/2021, 06/04/2021, 01/03/2022    Pfizer Purple Cap SARS-CoV-2 10/31/2023    Tdap vaccine, age 7 year and older (BOOSTRIX, ADACEL) 04/24/2014, 10/17/2019    Zoster vaccine, recombinant, adult (SHINGRIX) 04/02/2021     Patient's medical history was reviewed and updated either before or during this encounter.       Yvrose Doe, APRN-CNP

## 2024-07-03 NOTE — TELEPHONE ENCOUNTER
----- Message from Sonal Tyson sent at 7/3/2024 11:39 AM EDT -----  Regarding:  CHERI TANNER   Contact: [phone]  Gurwinder Frances,  Would you please send in a new prescription that indicates 90 day supply?   $35 for 30 day, $70 for 90 day supply.   Thank you!

## 2024-08-08 ENCOUNTER — HOSPITAL ENCOUNTER (OUTPATIENT)
Dept: RADIOLOGY | Facility: HOSPITAL | Age: 59
Discharge: HOME | End: 2024-08-08
Payer: COMMERCIAL

## 2024-08-08 DIAGNOSIS — M16.12 UNILATERAL PRIMARY OSTEOARTHRITIS, LEFT HIP: ICD-10-CM

## 2024-08-08 PROCEDURE — 77002 NEEDLE LOCALIZATION BY XRAY: CPT | Mod: LT

## 2024-08-08 PROCEDURE — 2550000001 HC RX 255 CONTRASTS: Performed by: ORTHOPAEDIC SURGERY

## 2024-08-08 PROCEDURE — 2500000005 HC RX 250 GENERAL PHARMACY W/O HCPCS: Performed by: ORTHOPAEDIC SURGERY

## 2024-08-08 PROCEDURE — 2500000004 HC RX 250 GENERAL PHARMACY W/ HCPCS (ALT 636 FOR OP/ED): Performed by: ORTHOPAEDIC SURGERY

## 2024-08-08 PROCEDURE — 20610 DRAIN/INJ JOINT/BURSA W/O US: CPT | Mod: LEFT SIDE | Performed by: RADIOLOGY

## 2024-08-08 PROCEDURE — 77002 NEEDLE LOCALIZATION BY XRAY: CPT | Mod: LEFT SIDE | Performed by: RADIOLOGY

## 2024-08-08 RX ORDER — LIDOCAINE HYDROCHLORIDE 10 MG/ML
5 INJECTION, SOLUTION EPIDURAL; INFILTRATION; INTRACAUDAL; PERINEURAL ONCE
Status: COMPLETED | OUTPATIENT
Start: 2024-08-08 | End: 2024-08-08

## 2024-08-08 RX ORDER — METHYLPREDNISOLONE ACETATE 40 MG/ML
40 INJECTION, SUSPENSION INTRA-ARTICULAR; INTRALESIONAL; INTRAMUSCULAR; SOFT TISSUE ONCE
Status: COMPLETED | OUTPATIENT
Start: 2024-08-08 | End: 2024-08-08

## 2024-08-08 RX ORDER — LIDOCAINE HYDROCHLORIDE 10 MG/ML
3 INJECTION, SOLUTION EPIDURAL; INFILTRATION; INTRACAUDAL; PERINEURAL ONCE
Status: COMPLETED | OUTPATIENT
Start: 2024-08-08 | End: 2024-08-08

## 2024-10-03 ENCOUNTER — OFFICE VISIT (OUTPATIENT)
Dept: PRIMARY CARE | Facility: CLINIC | Age: 59
End: 2024-10-03
Payer: COMMERCIAL

## 2024-10-03 VITALS
WEIGHT: 150 LBS | BODY MASS INDEX: 25.61 KG/M2 | DIASTOLIC BLOOD PRESSURE: 76 MMHG | OXYGEN SATURATION: 98 % | SYSTOLIC BLOOD PRESSURE: 114 MMHG | HEART RATE: 58 BPM | HEIGHT: 64 IN | TEMPERATURE: 96.6 F

## 2024-10-03 DIAGNOSIS — F51.01 PRIMARY INSOMNIA: Primary | ICD-10-CM

## 2024-10-03 DIAGNOSIS — Z23 ENCOUNTER FOR IMMUNIZATION: ICD-10-CM

## 2024-10-03 RX ORDER — ZOLPIDEM TARTRATE 10 MG/1
10 TABLET ORAL NIGHTLY PRN
Qty: 90 TABLET | Refills: 0 | Status: SHIPPED | OUTPATIENT
Start: 2024-10-03

## 2024-10-03 ASSESSMENT — ENCOUNTER SYMPTOMS
LOSS OF SENSATION IN FEET: 0
DIAPHORESIS: 0
CONFUSION: 0
VOMITING: 0
DEPRESSION: 0
CHILLS: 0
NAUSEA: 0
OCCASIONAL FEELINGS OF UNSTEADINESS: 0
FEVER: 0
AGITATION: 0
SLEEP DISTURBANCE: 1
FATIGUE: 0

## 2024-10-03 ASSESSMENT — PATIENT HEALTH QUESTIONNAIRE - PHQ9
2. FEELING DOWN, DEPRESSED OR HOPELESS: NOT AT ALL
1. LITTLE INTEREST OR PLEASURE IN DOING THINGS: NOT AT ALL
SUM OF ALL RESPONSES TO PHQ9 QUESTIONS 1 AND 2: 0

## 2024-10-03 ASSESSMENT — LIFESTYLE VARIABLES
HOW OFTEN DURING THE LAST YEAR HAVE YOU FOUND THAT YOU WERE NOT ABLE TO STOP DRINKING ONCE YOU HAD STARTED: NEVER
HOW OFTEN DO YOU HAVE SIX OR MORE DRINKS ON ONE OCCASION: NEVER
HOW OFTEN DURING THE LAST YEAR HAVE YOU FAILED TO DO WHAT WAS NORMALLY EXPECTED FROM YOU BECAUSE OF DRINKING: NEVER
AUDIT-C TOTAL SCORE: 0
HOW MANY STANDARD DRINKS CONTAINING ALCOHOL DO YOU HAVE ON A TYPICAL DAY: PATIENT DOES NOT DRINK
HOW OFTEN DURING THE LAST YEAR HAVE YOU BEEN UNABLE TO REMEMBER WHAT HAPPENED THE NIGHT BEFORE BECAUSE YOU HAD BEEN DRINKING: NEVER
HOW OFTEN DO YOU HAVE A DRINK CONTAINING ALCOHOL: NEVER
HOW OFTEN DURING THE LAST YEAR HAVE YOU HAD A FEELING OF GUILT OR REMORSE AFTER DRINKING: NEVER
HAS A RELATIVE, FRIEND, DOCTOR, OR ANOTHER HEALTH PROFESSIONAL EXPRESSED CONCERN ABOUT YOUR DRINKING OR SUGGESTED YOU CUT DOWN: NO
AUDIT TOTAL SCORE: 0
SKIP TO QUESTIONS 9-10: 1
HAVE YOU OR SOMEONE ELSE BEEN INJURED AS A RESULT OF YOUR DRINKING: NO
HOW OFTEN DURING THE LAST YEAR HAVE YOU NEEDED AN ALCOHOLIC DRINK FIRST THING IN THE MORNING TO GET YOURSELF GOING AFTER A NIGHT OF HEAVY DRINKING: NEVER

## 2024-10-03 ASSESSMENT — PAIN SCALES - GENERAL: PAINLEVEL: 0-NO PAIN

## 2024-10-03 NOTE — PROGRESS NOTES
[facility]: [facility]  PROGRESS NOTE      Sonal Tyson is a 59 y.o. female that is presenting today to meet  controlled substance protocol.    She is requesting to have annual influenza immunization today.    She reports she has a nightly routine to sleep and it has been very effective.    Assessment/Plan   Diagnoses and all orders for this visit:    Primary insomnia  -     zolpidem (Ambien) 10 mg tablet; Take 1 tablet (10 mg) by mouth as needed at bedtime for sleep.    Encounter for immunization  -     Flu vaccine, trivalent, preservative free, age 6 months and greater (Fluarix/Fluzone/Flulaval)    Other orders  -     Follow Up In Primary Care - Established    Subjective   - Today's appointment is to keep the patient in compliance with their controlled substance agreement (CSA).   - The patient has already signed their CSA on 04/04/24.  - The patient has successfully completed their urine drug screen (UDS) this year on 04/04/24.  - I have considered the risks of abuse, dependence, addiction, and/or diversion and have discussed these with the patient during our appointment.  - I do believe that the medication(s) are medically necessary. Patient has tried multiple alternatives to controlled substances in the past with limited success.  - The patient's symptoms are well-controlled on current therapy. No need for changes at this time.  - PDMP reviewed and appropriate with fill of 90 day Zolpidem on 07/18/24.        Review of Systems   Constitutional:  Negative for chills, diaphoresis, fatigue and fever.   Gastrointestinal:  Negative for nausea and vomiting.   Psychiatric/Behavioral:  Positive for sleep disturbance. Negative for agitation and confusion.       Objective   Vitals:    10/03/24 0825   BP: 114/76   Pulse: 58   Temp: 35.9 °C (96.6 °F)   SpO2: 98%      Body mass index is 25.75 kg/m².  Physical Exam  Constitutional:       General: She is not in acute distress.     Appearance: She  "is not ill-appearing.   Neurological:      General: No focal deficit present.      Mental Status: She is alert and oriented to person, place, and time.   Psychiatric:         Mood and Affect: Mood normal.         Behavior: Behavior normal.       Diagnostic Results   Lab Results   Component Value Date    GLUCOSE 96 05/20/2024    CALCIUM 9.3 05/20/2024     05/20/2024    K 3.4 (L) 05/20/2024    CO2 28 05/20/2024     05/20/2024    BUN 12 05/20/2024    CREATININE 0.63 05/20/2024     Lab Results   Component Value Date    ALT 21 05/20/2024    AST 25 05/20/2024    ALKPHOS 87 05/20/2024    BILITOT 0.5 05/20/2024     Lab Results   Component Value Date    WBC 5.3 05/20/2024    HGB 15.3 05/20/2024    HCT 47.3 (H) 05/20/2024    MCV 96 05/20/2024     05/20/2024     Lab Results   Component Value Date    CHOL 154 05/20/2024    CHOL 192 03/14/2023    CHOL 185 03/24/2022     Lab Results   Component Value Date    HDL 42.2 05/20/2024    HDL 37 (L) 03/14/2023    HDL 41 (L) 03/24/2022     Lab Results   Component Value Date    LDLCALC 89 05/20/2024    LDLCALC 99 03/14/2023    LDLCALC 105 03/24/2022     Lab Results   Component Value Date    TRIG 116 05/20/2024    TRIG 282 (H) 03/14/2023    TRIG 194 (H) 03/24/2022     No components found for: \"CHOLHDL\"  Lab Results   Component Value Date    HGBA1C 5.5 05/20/2024     Other labs not included in the list above were reviewed either before or during this encounter.    History    History reviewed. No pertinent past medical history.  History reviewed. No pertinent surgical history.  Family History   Problem Relation Name Age of Onset    Melanoma Mother      Heart disease Mother      Other (Heart Stent) Mother      Other (Triple Bypass) Brother  38    Melanoma Maternal Grandfather      Colon cancer Paternal Grandmother       Social History     Socioeconomic History    Marital status:      Spouse name: Not on file    Number of children: Not on file    Years of education: " Not on file    Highest education level: Not on file   Occupational History    Not on file   Tobacco Use    Smoking status: Every Day     Current packs/day: 1.00     Types: Cigarettes    Smokeless tobacco: Never   Vaping Use    Vaping status: Never Used   Substance and Sexual Activity    Alcohol use: Not Currently    Drug use: Never    Sexual activity: Not on file   Other Topics Concern    Not on file   Social History Narrative    Not on file     Social Determinants of Health     Financial Resource Strain: Not on file   Food Insecurity: Not on file   Transportation Needs: Not on file   Physical Activity: Not on file   Stress: Not on file   Social Connections: Not on file   Intimate Partner Violence: Not on file   Housing Stability: Not on file     Allergies   Allergen Reactions    Penicillin Itching     Current Outpatient Medications on File Prior to Visit   Medication Sig Dispense Refill    albuterol 90 mcg/actuation inhaler INHALE 2 PUFFS BY MOUTH AS NEEDED EVERY FOUR HOURS 25.5 g 2    ascorbic acid (Vitamin C) 1,000 mg tablet Take 1 tablet (1,000 mg) by mouth once daily.      cyanocobalamin (Vitamin B-12) 1,000 mcg tablet Take 1 tablet (1,000 mcg) by mouth once daily.      escitalopram (Lexapro) 20 mg tablet take 1 tablet by mouth once a day 90 tablet 3    famotidine (Pepcid) 20 mg tablet Take 1 tablet (20 mg) by mouth once daily.      ferrous sulfate (IRON ORAL) Take 1 tablet by mouth once daily.      fluticasone furoate-vilanteroL (Breo Ellipta) 200-25 mcg/dose inhaler Inhale 1 puff once daily. 3 each 11    folic acid (Folvite) 800 mcg tablet Take 1 tablet (0.8 mg) by mouth once daily.      ibuprofen 200 mg tablet Take 1 tablet (200 mg) by mouth every 6 hours if needed for mild pain (1 - 3).      levothyroxine (Synthroid, Levoxyl) 75 mcg tablet take 1 tablet by mouth in the morning on an empty stomach 90 tablet 3    loratadine 10 mg capsule Take 1 tablet by mouth once daily.      zolpidem (Ambien) 10 mg tablet  Take 1 tablet (10 mg) by mouth as needed at bedtime for sleep. 90 tablet 0     No current facility-administered medications on file prior to visit.     Immunization History   Administered Date(s) Administered    Flu vaccine (IIV4), preservative free *Check age/dose* 09/30/2020    Flu vaccine, quadrivalent, no egg protein, age 6 month or greater (FLUCELVAX) 10/19/2019, 11/17/2022    Influenza, injectable, quadrivalent 11/16/2015, 12/07/2016, 10/01/2019, 10/26/2021    Influenza, seasonal, injectable 10/05/2021, 10/31/2023    Moderna COVID-19 vaccine, bivalent, blue cap/gray label *Check age/dose* 11/17/2022    Moderna SARS-CoV-2 Vaccination 05/10/2021, 06/04/2021, 01/03/2022    Pfizer Purple Cap SARS-CoV-2 10/31/2023    Tdap vaccine, age 7 year and older (BOOSTRIX, ADACEL) 04/24/2014, 10/17/2019    Zoster vaccine, recombinant, adult (SHINGRIX) 04/02/2021     Patient's medical history was reviewed and updated either before or during this encounter.       Yvrose Doe, APRN-CNP

## 2024-10-24 ENCOUNTER — OFFICE VISIT (OUTPATIENT)
Dept: VASCULAR SURGERY | Facility: CLINIC | Age: 59
End: 2024-10-24
Payer: COMMERCIAL

## 2024-10-24 VITALS
DIASTOLIC BLOOD PRESSURE: 94 MMHG | BODY MASS INDEX: 25.75 KG/M2 | WEIGHT: 150 LBS | HEART RATE: 60 BPM | SYSTOLIC BLOOD PRESSURE: 163 MMHG | OXYGEN SATURATION: 96 %

## 2024-10-24 DIAGNOSIS — L81.9 DISCOLORATION OF SKIN: ICD-10-CM

## 2024-10-24 DIAGNOSIS — I73.9 PAD (PERIPHERAL ARTERY DISEASE) (CMS-HCC): Primary | ICD-10-CM

## 2024-10-24 DIAGNOSIS — Z72.0 TOBACCO USE: ICD-10-CM

## 2024-10-24 PROCEDURE — 99203 OFFICE O/P NEW LOW 30 MIN: CPT | Performed by: NURSE PRACTITIONER

## 2024-10-24 PROCEDURE — 99213 OFFICE O/P EST LOW 20 MIN: CPT | Performed by: NURSE PRACTITIONER

## 2024-10-24 ASSESSMENT — PATIENT HEALTH QUESTIONNAIRE - PHQ9
2. FEELING DOWN, DEPRESSED OR HOPELESS: SEVERAL DAYS
10. IF YOU CHECKED OFF ANY PROBLEMS, HOW DIFFICULT HAVE THESE PROBLEMS MADE IT FOR YOU TO DO YOUR WORK, TAKE CARE OF THINGS AT HOME, OR GET ALONG WITH OTHER PEOPLE: SOMEWHAT DIFFICULT
SUM OF ALL RESPONSES TO PHQ9 QUESTIONS 1 AND 2: 2
1. LITTLE INTEREST OR PLEASURE IN DOING THINGS: SEVERAL DAYS

## 2024-10-24 ASSESSMENT — LIFESTYLE VARIABLES
HOW OFTEN DO YOU HAVE A DRINK CONTAINING ALCOHOL: MONTHLY OR LESS
HOW OFTEN DO YOU HAVE SIX OR MORE DRINKS ON ONE OCCASION: LESS THAN MONTHLY
AUDIT-C TOTAL SCORE: 2
SKIP TO QUESTIONS 9-10: 0
HOW MANY STANDARD DRINKS CONTAINING ALCOHOL DO YOU HAVE ON A TYPICAL DAY: 1 OR 2

## 2024-10-24 ASSESSMENT — ENCOUNTER SYMPTOMS
LOSS OF SENSATION IN FEET: 0
DEPRESSION: 1
OCCASIONAL FEELINGS OF UNSTEADINESS: 1

## 2024-10-24 NOTE — PROGRESS NOTES
Subjective   Patient ID: Sonal Tyson is a 59 y.o. female who presents for   HPI    This is a 59 year old female with PMH insomnia who is being evaluated in office for skin discoloration of her legs.  Patient states she underwent right total knee arthroplasty in May 2023.  She had her left knee replaced 6 years ago.  Patient states she underwent right total knee arthroplasty in May 2023.  She had her left knee replaced 6 years ago.  She is unsure when the discoloration of the right lateral thigh began but thinks it may have been started after the surgery.  Tells me her  noticed it.  She has 48-year pack smoking history.  Continues to smoke 1 pack/day.  She walks 6-10,000 steps a day at work.  She denies claudication.  No nocturnal rest pain.  She did state in the past she had some pain in her right foot that would wake her up.  Unsure if her legs feel better elevated or in a dependent position.  Right lateral thigh discoloration      Review of Systems  As noted in the HPI    Objective   Physical Exam  Constitutional:  Alert and oriented to person, place, date/time in no acute distress.  HEENT:  Atraumatic, normocephalic. PERRL. EOMI.  Nares patent.  Mucous membranes moist.  .   Neck:  Trachea midline.  Respiratory:  Clear to auscultation.  Cardiac:  Regular rate and rhythm.  No murmurs.  Cardiovascular:  No edema of the extremities.  Pulse exam: Radial and femoral pulses palpable bilateral. Pedal pulses: palpable   Abdominal:  Soft, nontender, nondistended, bowel sounds present.  Musculoskeletal:  Moves extremities freely.  Dermatological: Clean and dry.  Mottling discoloration noted to the right lateral and hip and thigh, non tender, skin temp. WNL  Neurological: Alert and oriented to person, place, date/time  Psych:  Calm, cooperative      Assessment/Plan     Tobacco use  PAD    Patient to undergo PVRs and follow-up after studies  Continue current medications         Uma Carmona, DERRICK-ACACIA 10/24/24  1:46 PM

## 2024-11-14 ENCOUNTER — ANCILLARY PROCEDURE (OUTPATIENT)
Dept: VASCULAR MEDICINE | Facility: CLINIC | Age: 59
End: 2024-11-14
Payer: COMMERCIAL

## 2024-11-14 DIAGNOSIS — I73.9 PAD (PERIPHERAL ARTERY DISEASE) (CMS-HCC): ICD-10-CM

## 2024-11-14 PROCEDURE — 93923 UPR/LXTR ART STDY 3+ LVLS: CPT | Performed by: INTERNAL MEDICINE

## 2024-11-14 PROCEDURE — 93923 UPR/LXTR ART STDY 3+ LVLS: CPT

## 2024-11-21 ENCOUNTER — OFFICE VISIT (OUTPATIENT)
Dept: VASCULAR SURGERY | Facility: CLINIC | Age: 59
End: 2024-11-21
Payer: COMMERCIAL

## 2024-11-21 VITALS
SYSTOLIC BLOOD PRESSURE: 140 MMHG | WEIGHT: 150 LBS | DIASTOLIC BLOOD PRESSURE: 80 MMHG | HEART RATE: 70 BPM | OXYGEN SATURATION: 95 % | BODY MASS INDEX: 25.75 KG/M2

## 2024-11-21 DIAGNOSIS — L81.9 DISCOLORATION OF SKIN: ICD-10-CM

## 2024-11-21 DIAGNOSIS — Z72.0 TOBACCO USE: ICD-10-CM

## 2024-11-21 DIAGNOSIS — R23.1 LIVEDO RETICULARIS: Primary | ICD-10-CM

## 2024-11-21 PROCEDURE — 99213 OFFICE O/P EST LOW 20 MIN: CPT | Performed by: NURSE PRACTITIONER

## 2024-11-21 ASSESSMENT — LIFESTYLE VARIABLES
SKIP TO QUESTIONS 9-10: 0
HOW OFTEN DO YOU HAVE SIX OR MORE DRINKS ON ONE OCCASION: LESS THAN MONTHLY
HOW MANY STANDARD DRINKS CONTAINING ALCOHOL DO YOU HAVE ON A TYPICAL DAY: 1 OR 2
AUDIT-C TOTAL SCORE: 2
HOW OFTEN DO YOU HAVE A DRINK CONTAINING ALCOHOL: MONTHLY OR LESS

## 2024-11-21 ASSESSMENT — PATIENT HEALTH QUESTIONNAIRE - PHQ9
1. LITTLE INTEREST OR PLEASURE IN DOING THINGS: SEVERAL DAYS
2. FEELING DOWN, DEPRESSED OR HOPELESS: SEVERAL DAYS
SUM OF ALL RESPONSES TO PHQ9 QUESTIONS 1 AND 2: 2
10. IF YOU CHECKED OFF ANY PROBLEMS, HOW DIFFICULT HAVE THESE PROBLEMS MADE IT FOR YOU TO DO YOUR WORK, TAKE CARE OF THINGS AT HOME, OR GET ALONG WITH OTHER PEOPLE: SOMEWHAT DIFFICULT

## 2024-11-21 ASSESSMENT — ENCOUNTER SYMPTOMS
DEPRESSION: 1
LOSS OF SENSATION IN FEET: 0
OCCASIONAL FEELINGS OF UNSTEADINESS: 1

## 2024-11-21 NOTE — PROGRESS NOTES
Madhavi Jah [birth year]  F[facility] PAD    Patient returns to office after undergoing PVR's.  PVR's reviewed, Right JD 1.02 with TBI 0.61.  Advised smoking cessation.  Livedo reticularis of the right leg, dicussed possible autoimmune disorder and recommend referral to Dr Savanna Kohler.  Thank you back, she states she believes it started just before her right knee replacement in May 2023.  Remains nontender.  No pruritus.  Unchanged appearance.    This is a 59 year old female with PMH insomnia who is being evaluated in office for skin discoloration of her legs.  Patient states she underwent right total knee arthroplasty in May 2023.  She had her left knee replaced 6 years ago.  Patient states she underwent right total knee arthroplasty in May 2023.  She had her left knee replaced 6 years ago.  She is unsure when the discoloration of the right lateral thigh began but thinks it may have been started after the surgery.  Tells me her  noticed it.  She has 48-year pack smoking history.  Continues to smoke 1 pack/day.  She walks 6-10,000 steps a day at work.  She denies claudication.  No nocturnal rest pain.  She did state in the past she had some pain in her right foot that would wake her up.  Unsure if her legs feel better elevated or in a dependent position.  Right lateral thigh discoloration        Review of Systems  As noted in the HPI        Objective  Physical Exam  Constitutional:  Alert and oriented to person, place, date/time in no acute distress.  HEENT:  Atraumatic, normocephalic. PERRL. EOMI.  Nares patent.  Mucous membranes moist.  .   Neck:  Trachea midline.  Respiratory:  Clear to auscultation.  Cardiac:  Regular rate and rhythm.  No murmurs.  Cardiovascular:  No edema of the extremities.  Pulse exam: Radial and femoral pulses palpable bilateral. Pedal pulses: palpable   Abdominal:  Soft, nontender, nondistended, bowel sounds present.  Musculoskeletal:  Moves extremities freely.  Dermatological: Clean and dry.   Mottling discoloration noted to the right lateral and hip and thigh, non tender, skin temp. WNL  Neurological: Alert and oriented to person, place, date/time  Psych:  Calm, cooperative     Vascular US PVR without exercise    Result Date: 11/14/2024           [place] [id number]            Phone [phone]  Vascular Lab Report  VASC US PVR WITHOUT EXERCISE Patient Name:      CELSO Montgomery Physician:  [id number] Iesha Hinkle MD, RPVI Study Date:        11/14/2024           Ordering Provider:  [id number] QUANG GIBSON MRN/PID:           [id number]             Fellow: Accession#:        [id number]         Technologist:       Chepe Gutierrez RVT Date of Birth/Age: 1965 / 59 years Technologist 2: Gender:            F                    Encounter#:         [id number] Admission Status:  Outpatient           Location Performed: [facility]  Diagnosis/ICD: Peripheral vascular disease, unspecified-I73.9 Indication:    Peripheral vascular disease CPT Codes:     56304 Peripheral artery JD Only  CONCLUSIONS:  Right Lower PVR: No evidence of arterial occlusive disease in the right lower extremity at rest. Normal digital perfusion noted. Multiphasic flow is noted in the right posterior tibial artery and right dorsalis pedis artery.  Left Lower PVR: No evidence of arterial occlusive disease in the left lower extremity at rest. Normal digital perfusion noted. Multiphasic flow is noted in the left posterior tibial artery and left dorsalis pedis artery.  Imaging & Doppler Findings:  RIGHT Lower PVR                Pressures Ratios Right Posterior Tibial (Ankle) 127 mmHg  1.02 Right Dorsalis Pedis (Ankle)   122 mmHg  0.98 Right Digit (Great Toe)        76 mmHg   0.61   LEFT Lower PVR                Pressures Ratios Left Posterior Tibial (Ankle) 132 mmHg  1.06 Left Dorsalis Pedis (Ankle)   131 mmHg  1.06 Left  Digit (Great Toe)        80 mmHg   0.65                      Right     Left Brachial Pressure 124 mmHg 123 mmHg   [id number] Iesha Hinkle MD, RPVI Electronically signed by [id number] Iesha Hinkle MD, RPVI on 11/14/2024 at 5:47:28 PM  ** Final **         Assessment/Plan  Tobacco use  PAD      PVR's reviewed and discussed with Dr Lozada.  Recommend referral to rheumatology, Dr Savanna Kohler  Not on ASA or statin   Smoking cessation

## 2025-01-08 ASSESSMENT — ENCOUNTER SYMPTOMS
SLEEP DISTURBANCE: 1
FATIGUE: 0
NAUSEA: 0
FEVER: 0
VOMITING: 0

## 2025-01-08 NOTE — PROGRESS NOTES
[facility]: [facility]  PROGRESS NOTE      Sonal Tyson is a 59 y.o. female that is presenting today for controlled substance protocol.    Ms. Tyson reports taking Zolpidem every night. Sleep varies usually down at 9:30 and up at 4:30 am. Denies misuse.      Assessment/Plan     Diagnoses and all orders for this visit:    Primary insomnia  -     zolpidem (Ambien) 10 mg tablet; Take 1 tablet (10 mg) by mouth as needed at bedtime for sleep.  -     CSA agreed to and will be signed upon return.  -     urine toxicology to be completed upon return  -     follow up in 3 months to meet  controlled substance protocol    Mild intermittent asthma without complication ([facility]-H[facility])  -     fluticasone furoate-vilanteroL (Breo Ellipta) 200-25 mcg/dose inhaler; Inhale 1 puff once daily.    Subjective   - Today's appointment is to keep the patient in compliance with their controlled substance agreement (CSA).   - The patient has already signed their CSA at an earlier appointment on 04/04/24.  - The patient has completed their urine drug screen (UDS) this year on 04/04/24.  - I have considered the risks of abuse, dependence, addiction, and/or diversion and have discussed these with the patient during our appointment.  - I do believe that the medication(s) are medically necessary. Patient has tried multiple alternatives to controlled substances in the past with limited success.  - The patient's symptoms are well-controlled on current therapy.  - PDMP reviewed and appropriate with last fill of Zolpidem on 10/15/24 for 90 day supply.        Review of Systems   Constitutional:  Negative for fatigue and fever.   Gastrointestinal:  Negative for nausea and vomiting.   Psychiatric/Behavioral:  Positive for sleep disturbance.       Objective   There were no vitals filed for this visit.   There is no height or weight on file to calculate BMI.  Physical Exam  Constitutional:       General: She is not in acute  "distress.  Pulmonary:      Effort: Pulmonary effort is normal.   Neurological:      General: No focal deficit present.      Mental Status: She is alert.   Psychiatric:         Mood and Affect: Mood normal.         Behavior: Behavior normal.       Diagnostic Results   Lab Results   Component Value Date    GLUCOSE 96 05/20/2024    CALCIUM 9.3 05/20/2024     05/20/2024    K 3.4 (L) 05/20/2024    CO2 28 05/20/2024     05/20/2024    BUN 12 05/20/2024    CREATININE 0.63 05/20/2024     Lab Results   Component Value Date    ALT 21 05/20/2024    AST 25 05/20/2024    ALKPHOS 87 05/20/2024    BILITOT 0.5 05/20/2024     Lab Results   Component Value Date    WBC 5.3 05/20/2024    HGB 15.3 05/20/2024    HCT 47.3 (H) 05/20/2024    MCV 96 05/20/2024     05/20/2024     Lab Results   Component Value Date    CHOL 154 05/20/2024    CHOL 192 03/14/2023    CHOL 185 03/24/2022     Lab Results   Component Value Date    HDL 42.2 05/20/2024    HDL 37 (L) 03/14/2023    HDL 41 (L) 03/24/2022     Lab Results   Component Value Date    LDLCALC 89 05/20/2024    LDLCALC 99 03/14/2023    LDLCALC 105 03/24/2022     Lab Results   Component Value Date    TRIG 116 05/20/2024    TRIG 282 (H) 03/14/2023    TRIG 194 (H) 03/24/2022     No components found for: \"CHOLHDL\"  Lab Results   Component Value Date    HGBA1C 5.5 05/20/2024     Other labs not included in the list above were reviewed either before or during this encounter.    History    No past medical history on file.  No past surgical history on file.  Family History   Problem Relation Name Age of Onset    Melanoma Mother      Heart disease Mother      Other (Heart Stent) Mother      Other (Triple Bypass) Brother  38    Melanoma Maternal Grandfather      Colon cancer Paternal Grandmother       Social History     Socioeconomic History    Marital status:      Spouse name: Not on file    Number of children: Not on file    Years of education: Not on file    Highest education " level: Not on file   Occupational History    Not on file   Tobacco Use    Smoking status: Every Day     Current packs/day: 1.00     Types: Cigarettes    Smokeless tobacco: Never   Vaping Use    Vaping status: Never Used   Substance and Sexual Activity    Alcohol use: Not Currently    Drug use: Never    Sexual activity: Not on file   Other Topics Concern    Not on file   Social History Narrative    Not on file     Social Drivers of Health     Financial Resource Strain: Not on file   Food Insecurity: Not on file   Transportation Needs: Not on file   Physical Activity: Not on file   Stress: Not on file   Social Connections: Not on file   Intimate Partner Violence: Not on file   Housing Stability: Not on file     Allergies   Allergen Reactions    Penicillin Itching     Current Outpatient Medications on File Prior to Visit   Medication Sig Dispense Refill    albuterol 90 mcg/actuation inhaler INHALE 2 PUFFS BY MOUTH AS NEEDED EVERY FOUR HOURS 25.5 g 2    ascorbic acid (Vitamin C) 1,000 mg tablet Take 1 tablet (1,000 mg) by mouth once daily.      cyanocobalamin (Vitamin B-12) 1,000 mcg tablet Take 1 tablet (1,000 mcg) by mouth once daily.      escitalopram (Lexapro) 20 mg tablet take 1 tablet by mouth once a day 90 tablet 3    famotidine (Pepcid) 20 mg tablet Take 1 tablet (20 mg) by mouth once daily.      ferrous sulfate (IRON ORAL) Take 1 tablet by mouth once daily.      fluticasone furoate-vilanteroL (Breo Ellipta) 200-25 mcg/dose inhaler Inhale 1 puff once daily. 3 each 11    folic acid (Folvite) 800 mcg tablet Take 1 tablet (0.8 mg) by mouth once daily.      ibuprofen 200 mg tablet Take 1 tablet (200 mg) by mouth every 6 hours if needed for mild pain (1 - 3).      levothyroxine (Synthroid, Levoxyl) 75 mcg tablet take 1 tablet by mouth in the morning on an empty stomach 90 tablet 3    loratadine 10 mg capsule Take 1 tablet by mouth once daily.      zolpidem (Ambien) 10 mg tablet Take 1 tablet (10 mg) by mouth as  needed at bedtime for sleep. 90 tablet 0     No current facility-administered medications on file prior to visit.     Immunization History   Administered Date(s) Administered    Flu vaccine (IIV4), preservative free *Check age/dose* 09/30/2020    Flu vaccine, quadrivalent, no egg protein, age 6 month or greater (FLUCELVAX) 10/19/2019, 11/17/2022    Flu vaccine, trivalent, preservative free, age 6 months and greater (Fluarix/Fluzone/Flulaval) 10/03/2024    Influenza, injectable, quadrivalent 11/16/2015, 12/07/2016, 10/01/2019, 10/26/2021    Influenza, seasonal, injectable 10/05/2021, 10/31/2023    Moderna COVID-19 vaccine, bivalent, blue cap/gray label *Check age/dose* 11/17/2022    Moderna SARS-CoV-2 Vaccination 05/10/2021, 06/04/2021, 01/03/2022    Pfizer Purple Cap SARS-CoV-2 10/31/2023    Tdap vaccine, age 7 year and older (BOOSTRIX, ADACEL) 04/24/2014, 10/17/2019    Zoster vaccine, recombinant, adult (SHINGRIX) 04/02/2021     Patient's medical history was reviewed and updated either before or during this encounter.       Yvrose Doe, APRN-CNP

## 2025-01-09 ENCOUNTER — OFFICE VISIT (OUTPATIENT)
Dept: PRIMARY CARE | Facility: CLINIC | Age: 60
End: 2025-01-09
Payer: COMMERCIAL

## 2025-01-09 VITALS
SYSTOLIC BLOOD PRESSURE: 134 MMHG | HEIGHT: 64 IN | OXYGEN SATURATION: 99 % | TEMPERATURE: 97.3 F | HEART RATE: 57 BPM | DIASTOLIC BLOOD PRESSURE: 78 MMHG | WEIGHT: 159 LBS | BODY MASS INDEX: 27.14 KG/M2

## 2025-01-09 DIAGNOSIS — J45.20 MILD INTERMITTENT ASTHMA WITHOUT COMPLICATION (HHS-HCC): ICD-10-CM

## 2025-01-09 DIAGNOSIS — F51.01 PRIMARY INSOMNIA: Primary | ICD-10-CM

## 2025-01-09 PROCEDURE — 99213 OFFICE O/P EST LOW 20 MIN: CPT | Performed by: NURSE PRACTITIONER

## 2025-01-09 PROCEDURE — 3008F BODY MASS INDEX DOCD: CPT | Performed by: NURSE PRACTITIONER

## 2025-01-09 RX ORDER — FLUTICASONE FUROATE AND VILANTEROL 200; 25 UG/1; UG/1
1 POWDER RESPIRATORY (INHALATION) DAILY
Qty: 3 EACH | Refills: 3 | Status: SHIPPED | OUTPATIENT
Start: 2025-01-09

## 2025-01-09 RX ORDER — ZOLPIDEM TARTRATE 10 MG/1
10 TABLET ORAL NIGHTLY PRN
Qty: 90 TABLET | Refills: 0 | Status: SHIPPED | OUTPATIENT
Start: 2025-01-09

## 2025-01-09 ASSESSMENT — ENCOUNTER SYMPTOMS
LOSS OF SENSATION IN FEET: 0
DEPRESSION: 0
OCCASIONAL FEELINGS OF UNSTEADINESS: 0

## 2025-01-09 ASSESSMENT — LIFESTYLE VARIABLES
HOW OFTEN DURING THE LAST YEAR HAVE YOU NEEDED AN ALCOHOLIC DRINK FIRST THING IN THE MORNING TO GET YOURSELF GOING AFTER A NIGHT OF HEAVY DRINKING: NEVER
HOW OFTEN DURING THE LAST YEAR HAVE YOU FOUND THAT YOU WERE NOT ABLE TO STOP DRINKING ONCE YOU HAD STARTED: NEVER
HOW MANY STANDARD DRINKS CONTAINING ALCOHOL DO YOU HAVE ON A TYPICAL DAY: PATIENT DOES NOT DRINK
HOW OFTEN DURING THE LAST YEAR HAVE YOU HAD A FEELING OF GUILT OR REMORSE AFTER DRINKING: NEVER
AUDIT-C TOTAL SCORE: 0
HAS A RELATIVE, FRIEND, DOCTOR, OR ANOTHER HEALTH PROFESSIONAL EXPRESSED CONCERN ABOUT YOUR DRINKING OR SUGGESTED YOU CUT DOWN: NO
HOW OFTEN DO YOU HAVE SIX OR MORE DRINKS ON ONE OCCASION: NEVER
HOW OFTEN DURING THE LAST YEAR HAVE YOU FAILED TO DO WHAT WAS NORMALLY EXPECTED FROM YOU BECAUSE OF DRINKING: NEVER
HOW OFTEN DO YOU HAVE A DRINK CONTAINING ALCOHOL: NEVER
SKIP TO QUESTIONS 9-10: 1
HOW OFTEN DURING THE LAST YEAR HAVE YOU BEEN UNABLE TO REMEMBER WHAT HAPPENED THE NIGHT BEFORE BECAUSE YOU HAD BEEN DRINKING: NEVER
AUDIT TOTAL SCORE: 0
HAVE YOU OR SOMEONE ELSE BEEN INJURED AS A RESULT OF YOUR DRINKING: NO

## 2025-01-09 ASSESSMENT — PAIN SCALES - GENERAL: PAINLEVEL_OUTOF10: 0-NO PAIN

## 2025-01-23 ENCOUNTER — OFFICE VISIT (OUTPATIENT)
Dept: PRIMARY CARE | Facility: CLINIC | Age: 60
End: 2025-01-23
Payer: COMMERCIAL

## 2025-01-23 VITALS
HEART RATE: 65 BPM | DIASTOLIC BLOOD PRESSURE: 70 MMHG | HEIGHT: 64 IN | SYSTOLIC BLOOD PRESSURE: 138 MMHG | TEMPERATURE: 97.6 F | WEIGHT: 160 LBS | OXYGEN SATURATION: 96 % | BODY MASS INDEX: 27.31 KG/M2

## 2025-01-23 DIAGNOSIS — F41.9 ANXIETY: ICD-10-CM

## 2025-01-23 DIAGNOSIS — Z01.818 PREOP EXAM FOR INTERNAL MEDICINE: Primary | ICD-10-CM

## 2025-01-23 DIAGNOSIS — K21.9 GASTROESOPHAGEAL REFLUX DISEASE WITHOUT ESOPHAGITIS: ICD-10-CM

## 2025-01-23 DIAGNOSIS — E03.9 ACQUIRED HYPOTHYROIDISM: ICD-10-CM

## 2025-01-23 DIAGNOSIS — R73.9 HYPERGLYCEMIA: ICD-10-CM

## 2025-01-23 DIAGNOSIS — E78.2 MIXED HYPERLIPIDEMIA: ICD-10-CM

## 2025-01-23 DIAGNOSIS — J45.20 MILD INTERMITTENT ASTHMA WITHOUT COMPLICATION (HHS-HCC): ICD-10-CM

## 2025-01-23 DIAGNOSIS — F13.988: ICD-10-CM

## 2025-01-23 DIAGNOSIS — F51.01 PRIMARY INSOMNIA: ICD-10-CM

## 2025-01-23 PROCEDURE — 3008F BODY MASS INDEX DOCD: CPT | Performed by: NURSE PRACTITIONER

## 2025-01-23 PROCEDURE — 99214 OFFICE O/P EST MOD 30 MIN: CPT | Performed by: NURSE PRACTITIONER

## 2025-01-23 RX ORDER — B-COMPLEX WITH VITAMIN C
1 TABLET ORAL DAILY
COMMUNITY

## 2025-01-23 RX ORDER — ACETAMINOPHEN AND PHENYLEPHRINE HCL 325; 5 MG/1; MG/1
TABLET ORAL
COMMUNITY

## 2025-01-23 RX ORDER — FOLIC ACID 0.4 MG
0.4 TABLET ORAL DAILY
COMMUNITY

## 2025-01-23 RX ORDER — BISMUTH SUBSALICYLATE 262 MG
1 TABLET,CHEWABLE ORAL DAILY
COMMUNITY

## 2025-01-23 RX ORDER — ACETAMINOPHEN 500 MG
4000 TABLET ORAL NIGHTLY
COMMUNITY

## 2025-01-23 RX ORDER — MAGNESIUM 250 MG
250 TABLET ORAL NIGHTLY
COMMUNITY

## 2025-01-23 ASSESSMENT — LIFESTYLE VARIABLES
HOW OFTEN DO YOU HAVE SIX OR MORE DRINKS ON ONE OCCASION: NEVER
HOW OFTEN DURING THE LAST YEAR HAVE YOU NEEDED AN ALCOHOLIC DRINK FIRST THING IN THE MORNING TO GET YOURSELF GOING AFTER A NIGHT OF HEAVY DRINKING: NEVER
HAS A RELATIVE, FRIEND, DOCTOR, OR ANOTHER HEALTH PROFESSIONAL EXPRESSED CONCERN ABOUT YOUR DRINKING OR SUGGESTED YOU CUT DOWN: NO
HOW OFTEN DO YOU HAVE A DRINK CONTAINING ALCOHOL: NEVER
HOW OFTEN DURING THE LAST YEAR HAVE YOU FOUND THAT YOU WERE NOT ABLE TO STOP DRINKING ONCE YOU HAD STARTED: NEVER
SKIP TO QUESTIONS 9-10: 1
AUDIT TOTAL SCORE: 0
HOW OFTEN DURING THE LAST YEAR HAVE YOU BEEN UNABLE TO REMEMBER WHAT HAPPENED THE NIGHT BEFORE BECAUSE YOU HAD BEEN DRINKING: NEVER
HAVE YOU OR SOMEONE ELSE BEEN INJURED AS A RESULT OF YOUR DRINKING: NO
HOW OFTEN DURING THE LAST YEAR HAVE YOU HAD A FEELING OF GUILT OR REMORSE AFTER DRINKING: NEVER
HOW MANY STANDARD DRINKS CONTAINING ALCOHOL DO YOU HAVE ON A TYPICAL DAY: PATIENT DOES NOT DRINK
AUDIT-C TOTAL SCORE: 0
HOW OFTEN DURING THE LAST YEAR HAVE YOU FAILED TO DO WHAT WAS NORMALLY EXPECTED FROM YOU BECAUSE OF DRINKING: NEVER

## 2025-01-23 ASSESSMENT — ENCOUNTER SYMPTOMS
DIZZINESS: 0
FEVER: 0
SEIZURES: 0
HEMATURIA: 0
BACK PAIN: 0
PALPITATIONS: 0
COUGH: 0
VOMITING: 0
ABDOMINAL PAIN: 0
ADENOPATHY: 0
BLOOD IN STOOL: 0
DIAPHORESIS: 0
BRUISES/BLEEDS EASILY: 0
FACIAL ASYMMETRY: 0
SPEECH DIFFICULTY: 0
NAUSEA: 0
POLYDIPSIA: 0
CONFUSION: 0
CHEST TIGHTNESS: 0
DYSURIA: 0
AGITATION: 0
FLANK PAIN: 0
OCCASIONAL FEELINGS OF UNSTEADINESS: 0
HEADACHES: 0
DEPRESSION: 0
CHILLS: 0
SHORTNESS OF BREATH: 0
FATIGUE: 0
NECK PAIN: 0
LOSS OF SENSATION IN FEET: 0
WOUND: 0
POLYPHAGIA: 0
ARTHRALGIAS: 1

## 2025-01-23 ASSESSMENT — PAIN SCALES - GENERAL: PAINLEVEL_OUTOF10: 8

## 2025-01-23 NOTE — PROGRESS NOTES
[facility]: [facility]  PROGRESS NOTE      Sonal Tyson is a 59 y.o. female that is presenting today for Pre-op Exam.    Ms. Tyson is scheduled for Left HEATHER with Dr. Umer Cruz at [facility] on 02/25/25. She is scheduled for PAT at [facility] - uncertain of date but states it is just prior to her surgery.  She reports chronic medical conditions stable and has no new complaints.    Assessment/Plan     Diagnoses and all orders for this visit:    Preop exam for internal medicine        -     Ms. Tyson's chronic medical conditions are stable. Physical exam unremarkable. She was encouraged to quit smoking prior to surgery. Otherwise, she is medically optimized and cleared for above noted surgery.    Primary insomnia  -     Compliant with  controlled substance protocol  -     zolpidem 10 mg nightly as needed  -     CBC and Auto Differential; Future  -     Comprehensive Metabolic Panel; Future  -     follow up in 3 months    Sedative, hypnotic or anxiolytic use, unsp w oth disorder (Multi)        -     compliant with  controlled substance protocol    Gastroesophageal reflux disease without esophagitis         -     famotidine 20 mg daily    Acquired hypothyroidism  -     TSH with reflex to Free T4 if abnormal; Future  -     levothyroxine 75 mcg daily    Anxiety        -     escitalopram 20 mg daily    Mild intermittent asthma without complication ([facility])        -     Breo Ellipta 200-25 mcg/dose inhaler daily        -     albuterol 90 mcg/actuation inhaler as needed          Mixed hyperlipidemia  -     Comprehensive Metabolic Panel; Future  -     Lipid Panel; Future    Hyperglycemia  -     Comprehensive Metabolic Panel; Future  -     Hemoglobin A1C; Future    Other orders  -     Follow Up In Primary Care - Health Maintenance; Future    Subjective   - Today's appointment is to keep the patient in compliance with their controlled substance agreement (CSA).   - The patient has already  signed their CSA at an earlier appointment on 04/04/24.  - The patient has completed their urine drug screen (UDS) this year on 04/04/24.  - I have considered the risks of abuse, dependence, addiction, and/or diversion and have discussed these with the patient during our appointment.  - I do believe that the medication(s) are medically necessary. Patient has tried multiple alternatives to controlled substances in the past with limited success.  - The patient's symptoms are well-controlled on current therapy.  - PDMP reviewed and appropriate with last zolpidem fill for 90 days on 01/12/25.        Review of Systems   Constitutional:  Negative for chills, diaphoresis, fatigue and fever.   HENT:  Negative for hearing loss and mouth sores.    Eyes:  Negative for visual disturbance.   Respiratory:  Negative for cough, chest tightness and shortness of breath.    Cardiovascular:  Negative for chest pain, palpitations and leg swelling.   Gastrointestinal:  Negative for abdominal pain, blood in stool, nausea and vomiting.   Endocrine: Negative for cold intolerance, heat intolerance, polydipsia, polyphagia and polyuria.   Genitourinary:  Negative for dysuria, flank pain and hematuria.   Musculoskeletal:  Positive for arthralgias (left hip). Negative for back pain and neck pain.   Skin:  Negative for rash and wound.   Allergic/Immunologic: Negative for environmental allergies, food allergies and immunocompromised state.   Neurological:  Negative for dizziness, seizures, syncope, facial asymmetry, speech difficulty and headaches.   Hematological:  Negative for adenopathy. Does not bruise/bleed easily.   Psychiatric/Behavioral:  Negative for agitation and confusion.       Objective   Vitals:    01/23/25 1324   BP: 138/70   Pulse: 65   Temp: 36.4 °C (97.6 °F)   SpO2: 96%      Body mass index is 27.46 kg/m².  Physical Exam  Vitals and nursing note reviewed.   Constitutional:       General: She is not in acute distress.      Appearance: Normal appearance. She is not ill-appearing.   HENT:      Head: Normocephalic and atraumatic.      Right Ear: Tympanic membrane, ear canal and external ear normal. There is no impacted cerumen.      Left Ear: Tympanic membrane, ear canal and external ear normal. There is no impacted cerumen.      Nose: Nose normal.      Mouth/Throat:      Mouth: Mucous membranes are moist.      Pharynx: Oropharynx is clear. No oropharyngeal exudate or posterior oropharyngeal erythema.   Eyes:      General: No scleral icterus.        Right eye: No discharge.         Left eye: No discharge.      Extraocular Movements: Extraocular movements intact.      Conjunctiva/sclera: Conjunctivae normal.      Pupils: Pupils are equal, round, and reactive to light.   Neck:      Vascular: No carotid bruit.   Cardiovascular:      Rate and Rhythm: Normal rate and regular rhythm.      Pulses: Normal pulses.      Heart sounds: Normal heart sounds. No murmur heard.  Pulmonary:      Effort: Pulmonary effort is normal. No respiratory distress.      Breath sounds: Wheezing present.   Chest:      Chest wall: No tenderness.   Abdominal:      General: Abdomen is flat. Bowel sounds are normal. There is no distension.      Palpations: Abdomen is soft. There is no mass.      Tenderness: There is no abdominal tenderness. There is no right CVA tenderness or left CVA tenderness.      Hernia: No hernia is present.   Musculoskeletal:         General: No tenderness. Normal range of motion.      Cervical back: No tenderness.      Right lower leg: No edema.      Left lower leg: No edema.   Lymphadenopathy:      Cervical: No cervical adenopathy.   Skin:     General: Skin is warm and dry.      Coloration: Skin is not jaundiced.      Findings: No rash.   Neurological:      General: No focal deficit present.      Mental Status: She is alert and oriented to person, place, and time. Mental status is at baseline.   Psychiatric:         Mood and Affect: Mood normal.  "        Behavior: Behavior normal.       Diagnostic Results   Lab Results   Component Value Date    GLUCOSE 96 2024    CALCIUM 9.3 2024     2024    K 3.4 (L) 2024    CO2 28 2024     2024    BUN 12 2024    CREATININE 0.63 2024     Lab Results   Component Value Date    ALT 21 2024    AST 25 2024    ALKPHOS 87 2024    BILITOT 0.5 2024     Lab Results   Component Value Date    WBC 5.3 2024    HGB 15.3 2024    HCT 47.3 (H) 2024    MCV 96 2024     2024     Lab Results   Component Value Date    CHOL 154 2024    CHOL 192 2023    CHOL 185 2022     Lab Results   Component Value Date    HDL 42.2 2024    HDL 37 (L) 2023    HDL 41 (L) 2022     Lab Results   Component Value Date    LDLCALC 89 2024    LDLCALC 99 2023    LDLCALC 105 2022     Lab Results   Component Value Date    TRIG 116 2024    TRIG 282 (H) 2023    TRIG 194 (H) 2022     No components found for: \"CHOLHDL\"  Lab Results   Component Value Date    HGBA1C 5.5 2024     Other labs not included in the list above were reviewed either before or during this encounter.    History    Past Medical History:   Diagnosis Date    Allergic     Anxiety     Arthritis     Asthma     Depression     Disease of thyroid gland     GERD (gastroesophageal reflux disease)     Varicella      Past Surgical History:   Procedure Laterality Date     SECTION, LOW TRANSVERSE      CHOLECYSTECTOMY      JOINT REPLACEMENT       Family History   Problem Relation Name Age of Onset    Melanoma Mother Mom     Heart disease Mother Mom     Other (Heart Stent) Mother Mom     Arthritis Mother Mom     Other (Triple Bypass) Brother  38    Melanoma Maternal Grandfather      Colon cancer Paternal Grandmother Mama      Social History     Socioeconomic History    Marital status:      Spouse name: Not on file "    Number of children: Not on file    Years of education: Not on file    Highest education level: Not on file   Occupational History    Not on file   Tobacco Use    Smoking status: Every Day     Current packs/day: 1.00     Average packs/day: 1 pack/day for 15.0 years (15.0 ttl pk-yrs)     Types: Cigarettes    Smokeless tobacco: Current   Vaping Use    Vaping status: Never Used   Substance and Sexual Activity    Alcohol use: Not Currently    Drug use: Not Currently     Types: Marijuana    Sexual activity: Yes     Partners: Male     Birth control/protection: Post-menopausal   Other Topics Concern    Not on file   Social History Narrative    Not on file     Social Drivers of Health     Financial Resource Strain: Not on file   Food Insecurity: Not on file   Transportation Needs: Not on file   Physical Activity: Not on file   Stress: Not on file   Social Connections: Not on file   Intimate Partner Violence: Not on file   Housing Stability: Not on file     Allergies   Allergen Reactions    Penicillin Itching     Current Outpatient Medications on File Prior to Visit   Medication Sig Dispense Refill    albuterol 90 mcg/actuation inhaler INHALE 2 PUFFS BY MOUTH AS NEEDED EVERY FOUR HOURS 25.5 g 2    ascorbic acid (VITAMIN C ORAL) Take by mouth. With zinc 2 tabs at bedtime      B complex-vitamin C tablet Take 1 tablet by mouth once daily.      biotin 10,000 mcg capsule Take by mouth.      cholecalciferol (Vitamin D3) 50 mcg (2,000 unit) capsule Take 2 capsules (100 mcg) by mouth once daily at bedtime.      escitalopram (Lexapro) 20 mg tablet take 1 tablet by mouth once a day 90 tablet 3    famotidine (Pepcid) 20 mg tablet Take 1 tablet (20 mg) by mouth once daily.      fluticasone furoate-vilanteroL (Breo Ellipta) 200-25 mcg/dose inhaler Inhale 1 puff once daily. 3 each 3    folic acid (Folvite) 400 mcg tablet Take 1 tablet (0.4 mg) by mouth once daily.      ibuprofen 200 mg tablet Take 1 tablet (200 mg) by mouth every 6  hours if needed for mild pain (1 - 3).      levothyroxine (Synthroid, Levoxyl) 75 mcg tablet take 1 tablet by mouth in the morning on an empty stomach 90 tablet 3    loratadine 10 mg capsule Take 1 tablet by mouth once daily.      magnesium 250 mg tablet Take 1 tablet (250 mg) by mouth once daily at bedtime.      multivitamin tablet Take 1 tablet by mouth once daily.      zolpidem (Ambien) 10 mg tablet Take 1 tablet (10 mg) by mouth as needed at bedtime for sleep. 90 tablet 0    [DISCONTINUED] ascorbic acid (Vitamin C) 1,000 mg tablet Take 1 tablet (1,000 mg) by mouth once daily.      [DISCONTINUED] cyanocobalamin (Vitamin B-12) 1,000 mcg tablet Take 1 tablet (1,000 mcg) by mouth once daily.      [DISCONTINUED] ferrous sulfate (IRON ORAL) Take 1 tablet by mouth once daily.      [DISCONTINUED] folic acid (Folvite) 800 mcg tablet Take 1 tablet (0.8 mg) by mouth once daily.       No current facility-administered medications on file prior to visit.     Immunization History   Administered Date(s) Administered    Flu vaccine (IIV4), preservative free *Check age/dose* 09/30/2020    Flu vaccine, quadrivalent, no egg protein, age 6 month or greater (FLUCELVAX) 10/19/2019, 11/17/2022    Flu vaccine, trivalent, preservative free, age 6 months and greater (Fluarix/Fluzone/Flulaval) 10/03/2024    Influenza, injectable, quadrivalent 11/16/2015, 12/07/2016, 10/01/2019, 10/26/2021    Influenza, seasonal, injectable 10/05/2021, 10/31/2023    Moderna COVID-19 vaccine, bivalent, blue cap/gray label *Check age/dose* 11/17/2022    Moderna SARS-CoV-2 Vaccination 05/10/2021, 06/04/2021, 01/03/2022    Pfizer Purple Cap SARS-CoV-2 10/31/2023    Tdap vaccine, age 7 year and older (BOOSTRIX, ADACEL) 04/24/2014, 10/17/2019    Zoster vaccine, recombinant, adult (SHINGRIX) 04/02/2021     Patient's medical history was reviewed and updated either before or during this encounter.       Yvrose Doe, APRN-CNP

## 2025-02-12 ENCOUNTER — PRE-ADMISSION TESTING (OUTPATIENT)
Dept: PREADMISSION TESTING | Facility: HOSPITAL | Age: 60
End: 2025-02-12
Payer: COMMERCIAL

## 2025-02-12 VITALS
RESPIRATION RATE: 16 BRPM | OXYGEN SATURATION: 99 % | WEIGHT: 154 LBS | DIASTOLIC BLOOD PRESSURE: 71 MMHG | BODY MASS INDEX: 26.29 KG/M2 | SYSTOLIC BLOOD PRESSURE: 131 MMHG | TEMPERATURE: 96.6 F | HEART RATE: 66 BPM | HEIGHT: 64 IN

## 2025-02-12 DIAGNOSIS — R73.03 PREDIABETES: Primary | ICD-10-CM

## 2025-02-12 DIAGNOSIS — Z01.818 PREOPERATIVE TESTING: ICD-10-CM

## 2025-02-12 LAB
ABO GROUP (TYPE) IN BLOOD: NORMAL
ANION GAP SERPL CALCULATED.3IONS-SCNC: 11 MMOL/L (ref 10–20)
ANTIBODY SCREEN: NORMAL
BUN SERPL-MCNC: 15 MG/DL (ref 6–23)
CALCIUM SERPL-MCNC: 8.9 MG/DL (ref 8.6–10.3)
CHLORIDE SERPL-SCNC: 105 MMOL/L (ref 98–107)
CO2 SERPL-SCNC: 26 MMOL/L (ref 21–32)
CREAT SERPL-MCNC: 0.52 MG/DL (ref 0.5–1.05)
EGFRCR SERPLBLD CKD-EPI 2021: >90 ML/MIN/1.73M*2
ERYTHROCYTE [DISTWIDTH] IN BLOOD BY AUTOMATED COUNT: 13.2 % (ref 11.5–14.5)
EST. AVERAGE GLUCOSE BLD GHB EST-MCNC: 111 MG/DL
GLUCOSE SERPL-MCNC: 88 MG/DL (ref 74–99)
HBA1C MFR BLD: 5.5 %
HCT VFR BLD AUTO: 45.2 % (ref 36–46)
HGB BLD-MCNC: 14.7 G/DL (ref 12–16)
MCH RBC QN AUTO: 30.9 PG (ref 26–34)
MCHC RBC AUTO-ENTMCNC: 32.5 G/DL (ref 32–36)
MCV RBC AUTO: 95 FL (ref 80–100)
NRBC BLD-RTO: 0 /100 WBCS (ref 0–0)
PLATELET # BLD AUTO: 200 X10*3/UL (ref 150–450)
POTASSIUM SERPL-SCNC: 3.9 MMOL/L (ref 3.5–5.3)
RBC # BLD AUTO: 4.75 X10*6/UL (ref 4–5.2)
RH FACTOR (ANTIGEN D): NORMAL
SODIUM SERPL-SCNC: 138 MMOL/L (ref 136–145)
TSH SERPL-ACNC: 2.46 MIU/L (ref 0.44–3.98)
WBC # BLD AUTO: 6 X10*3/UL (ref 4.4–11.3)

## 2025-02-12 PROCEDURE — 80048 BASIC METABOLIC PNL TOTAL CA: CPT

## 2025-02-12 PROCEDURE — 87081 CULTURE SCREEN ONLY: CPT | Mod: TRILAB

## 2025-02-12 PROCEDURE — 93005 ELECTROCARDIOGRAM TRACING: CPT

## 2025-02-12 PROCEDURE — 93010 ELECTROCARDIOGRAM REPORT: CPT | Performed by: INTERNAL MEDICINE

## 2025-02-12 PROCEDURE — 36415 COLL VENOUS BLD VENIPUNCTURE: CPT

## 2025-02-12 PROCEDURE — 85027 COMPLETE CBC AUTOMATED: CPT

## 2025-02-12 PROCEDURE — 84443 ASSAY THYROID STIM HORMONE: CPT

## 2025-02-12 PROCEDURE — 86850 RBC ANTIBODY SCREEN: CPT

## 2025-02-12 PROCEDURE — 99204 OFFICE O/P NEW MOD 45 MIN: CPT | Performed by: NURSE PRACTITIONER

## 2025-02-12 PROCEDURE — 83036 HEMOGLOBIN GLYCOSYLATED A1C: CPT | Mod: TRILAB

## 2025-02-12 RX ORDER — CALCIUM CARBONATE 200(500)MG
1 TABLET,CHEWABLE ORAL 4 TIMES DAILY PRN
COMMUNITY

## 2025-02-12 RX ORDER — CHLORHEXIDINE GLUCONATE ORAL RINSE 1.2 MG/ML
SOLUTION DENTAL
Qty: 473 ML | Refills: 0 | Status: SHIPPED | OUTPATIENT
Start: 2025-02-12

## 2025-02-12 RX ORDER — FERROUS SULFATE 325(65) MG
325 TABLET, DELAYED RELEASE (ENTERIC COATED) ORAL DAILY
COMMUNITY

## 2025-02-12 ASSESSMENT — ENCOUNTER SYMPTOMS
ENDOCRINE NEGATIVE: 1
ACTIVITY CHANGE: 1
PSYCHIATRIC NEGATIVE: 1
ARTHRALGIAS: 1
GASTROINTESTINAL NEGATIVE: 1
CARDIOVASCULAR NEGATIVE: 1
EYES NEGATIVE: 1
RESPIRATORY NEGATIVE: 1

## 2025-02-12 NOTE — H&P (VIEW-ONLY)
"CPM/PAT Evaluation       Name: Sonal Tyson (Sonal Tyson)  /Age: 1965/59 y.o.     In-Person       Chief Complaint: LEFT HIP OSTEOARTHRITIS     HPI  A 59-year-old female with left hip osteoarthritis. History of chronic progressive radiating left hip pain for \"years\" the past that has become more severe in the past several months.  Symptoms increased with prolonged standing, ambulation, bending or sitting in a car interfering with sleep, ADLs and quality of life.  Conservative treatments/injections not lasting/helping.  Denies fever, chills, chest pain, shortness of breath, syncope, or left lower extremity numbness.  She is scheduled for left total hip arthroplasty-lateral approach.        Past Medical History:   Diagnosis Date    Adverse effect of anesthesia     STATES OXYGEN WAS LOW AFTER LAST KNEE REPLACEMENT    Allergic     Anxiety     Arthritis     Asthma     Chronic pain disorder     Hip pain    Depression     Disease of thyroid gland     Easy bruising     GERD (gastroesophageal reflux disease)     Hypothyroid     Joint pain     Varicella        Past Surgical History:   Procedure Laterality Date     SECTION, LOW TRANSVERSE      CHOLECYSTECTOMY      JOINT REPLACEMENT      LASIK      OTHER SURGICAL HISTORY           Allergies   Allergen Reactions    House Dust Cough    Penicillin Itching       Current Outpatient Medications   Medication Sig Dispense Refill    albuterol 90 mcg/actuation inhaler INHALE 2 PUFFS BY MOUTH AS NEEDED EVERY FOUR HOURS 25.5 g 2    ascorbic acid (VITAMIN C ORAL) Take by mouth. With zinc 2 tabs at bedtime      B complex-vitamin C tablet Take 1 tablet by mouth once daily.      biotin 10,000 mcg capsule Take 1 capsule (10 mg) by mouth once daily.      calcium carbonate (Tums) 200 mg calcium chewable tablet Chew 1 tablet (500 mg) 4 times a day as needed for indigestion or heartburn.      chlorhexidine (Peridex) 0.12 % solution Use cap to measure 15 mL.  Swish/gargle " mouthwash for at least 30 seconds.  Do not swallow.  Use night before surgery after brushing teeth and morning of surgery after brushing teeth. 473 mL 0    cholecalciferol (Vitamin D3) 50 mcg (2,000 unit) capsule Take 2 capsules (100 mcg) by mouth once daily at bedtime.      escitalopram (Lexapro) 20 mg tablet take 1 tablet by mouth once a day 90 tablet 3    famotidine (Pepcid) 20 mg tablet Take 1 tablet (20 mg) by mouth once daily.      ferrous sulfate 325 (65 Fe) MG EC tablet Take 1 tablet by mouth once daily. Do not crush, chew, or split.      fluticasone furoate-vilanteroL (Breo Ellipta) 200-25 mcg/dose inhaler Inhale 1 puff once daily. 3 each 3    folic acid (Folvite) 400 mcg tablet Take 1 tablet (0.4 mg) by mouth once daily.      ibuprofen 200 mg tablet Take 1 tablet (200 mg) by mouth every 6 hours if needed for mild pain (1 - 3).      levothyroxine (Synthroid, Levoxyl) 75 mcg tablet take 1 tablet by mouth in the morning on an empty stomach 90 tablet 3    loratadine 10 mg capsule Take 1 tablet by mouth once daily.      magnesium 250 mg tablet Take 1 tablet (250 mg) by mouth once daily at bedtime.      multivitamin tablet Take 1 tablet by mouth once daily.      zolpidem (Ambien) 10 mg tablet Take 1 tablet (10 mg) by mouth as needed at bedtime for sleep. 90 tablet 0     No current facility-administered medications for this visit.          Review of Systems   Constitutional:  Positive for activity change.   HENT: Negative.     Eyes: Negative.    Respiratory: Negative.          Smoker  asthma   Cardiovascular: Negative.    Gastrointestinal: Negative.    Endocrine: Negative.    Genitourinary: Negative.    Musculoskeletal:  Positive for arthralgias and gait problem.        Chronic left hip pain   Skin: Negative.    Psychiatric/Behavioral: Negative.          Physical Exam  Vitals reviewed.   HENT:      Head: Normocephalic and atraumatic.      Mouth/Throat:      Mouth: Mucous membranes are moist.   Eyes:      Pupils:  "Pupils are equal, round, and reactive to light.   Cardiovascular:      Rate and Rhythm: Normal rate and regular rhythm.   Pulmonary:      Effort: Pulmonary effort is normal.      Breath sounds: Normal breath sounds.   Abdominal:      Palpations: Abdomen is soft.   Musculoskeletal:      Cervical back: Normal range of motion.      Comments: Chronic left hip pain, antalgic gait   Skin:     General: Skin is warm and dry.   Neurological:      Mental Status: She is alert and oriented to person, place, and time.   Psychiatric:         Mood and Affect: Mood normal.          PAT AIRWAY:   Airway:     Mallampati::  II    Neck ROM::  Full  normal     lower dentures and upper dentures      /71   Pulse 66   Temp 35.9 °C (96.6 °F) (Temporal)   Resp 16   Ht 1.626 m (5' 4\")   Wt 69.9 kg (154 lb)   LMP  (LMP Unknown)   SpO2 99%   BMI 26.43 kg/m²         Stop Bang Score 2   CHADS: 1.9%  DASI risk score: 24.2  METS: 5.7  MEHUL: 0.14%  RCRI:0.9%  ASA: II  ARISCAT:1.6% score 19  Medical clearance done 1/23/2025 with PCP Yvrose Doe  PAT orders CBC, BMP, hemoglobin A1c, TSH, MRSA, EKG  EKG at PAT normal sinus rhythm, nonspecific ST abnormality (preliminary)      Assessment and Plan:      LEFT HIP OSTEOARTHRITIS Plan: Left total hip arthroplasty-lateral approach.  Hypothyroidism managed with levothyroxine  Depression/anxiety takes Lexapro  History of asthma managed with Breo Ellipta, albuterol  GERD managed with famotidine  Prediabetes-hemoglobin A1c ordered  Smoker cigarettes 1 pack/day x 48 years  BMI 26.43        "

## 2025-02-12 NOTE — PREPROCEDURE INSTRUCTIONS
Why must I stop eating and drinking near surgery time?  With sedation, food or liquid in your stomach can enter your lungs causing serious complications  Increases nausea and vomiting    When do I need to stop eating and drinking before my surgery?   Do not eat or drink after midnight the night before your surgery/procedure.  You may have small sips of water to take your medication.    PAT DISCHARGE INSTRUCTIONS    Please call the Same Day Surgery (SDS) Department of the hospital where your procedure will be performed after 2:00 PM the day before your surgery. If you are scheduled on a Monday, or a Tuesday following a Monday holiday, you will need to call on the last business day prior to your surgery.    [place] [id number] [place] [id number] [phone]  Second Floor      Please let your surgeon know if:      You develop any open sores, shingles, burning or painful urination as these may increase your risk of an infection.   You no longer wish to have the surgery.   Any other personal circumstances change that may lead to the need to cancel or defer this surgery-such as being sick or getting admitted to any hospital within one week of your planned procedure.    Your contact details change, such as a change of address or phone number.    Starting now:     Please DO NOT drink alcohol or smoke for 24 hours before surgery. It is well known that quitting smoking can make a huge difference to your health and recovery from surgery. The longer you abstain from smoking, the better your chances of a healthy recovery. If you need help with quitting, call 0-800-QUIT-NOW to be connected to a trained counselor who will discuss the best methods to help you quit.     Before your surgery:    Please stop all supplements 7 days prior to surgery. Or as directed by your surgeon.   Please stop taking NSAID pain medicine such as Advil and Motrin 7 days before surgery.    If you develop  any fever, cough, cold, rashes, cuts, scratches, scrapes, urinary symptoms or infection anywhere on your body (including teeth and gums) prior to surgery, please call your surgeon’s office as soon as possible. This may require treatment to reduce the chance of cancellation on the day of surgery.    The day before your surgery:   DIET- Please follow the diet instructions at the top of your packet.   Get a good night’s rest.  Use the special soap for bathing if you have been instructed to use one.    Scheduled surgery times may change and you will be notified if this occurs - please check your personal voicemail for any updates.     On the morning of surgery:   Wear comfortable, loose fitting clothes which open in the front. Please do not wear moisturizers, creams, lotions, makeup or perfume.    Please bring with you to surgery:   Photo ID and insurance card   Current list of medicines and allergies   Pacemaker/ Defibrillator/Heart stent cards   CPAP machine and mask    Slings/ splints/ crutches   A copy of your complete advanced directive/DHPOA.    Please do NOT bring with you to surgery:   All jewelry and valuables should be left at home.   Prosthetic devices such as contact lenses, hearing aids, dentures, eyelash extensions, hairpins and body piercings must be removed prior to going in to the surgical suite.    After outpatient surgery:   A responsible adult MUST accompany you at the time of discharge and stay with you for 24 hours after your surgery. You may NOT drive yourself home after surgery.    Do not drive, operate machinery, make critical decisions or do activities that require co-ordination or balance until after a night’s sleep.   Do not drink alcoholic beverages for 24 hours.   Instructions for resuming your medications will be provided by your surgeon.    CALL YOUR DOCTOR AFTER SURGERY IF YOU HAVE:     Chills and/or a fever of 101° F or higher.    Redness, swelling, pus or drainage from your surgical  wound or a bad smell from the wound.    Lightheadedness, fainting or confusion.    Persistent vomiting (throwing up) and are not able to eat or drink for 12 hours.    Three or more loose, watery bowel movements in 24 hours (diarrhea).   Difficulty or pain while urinating( after non-urological surgery)    Pain and swelling in your legs, especially if it is only on one side.    Difficulty breathing or are breathing faster than normal.    Any new concerning symptoms.        Patient Information: Pre-Operative Infection Prevention Measures     Why did I have my nose, under my arms, and groin swabbed?  The purpose of the swab is to identify Staphylococcus aureus inside your nose or on your skin.  The swab was sent to the laboratory for culture.  A positive swab/culture for Staphylococcus aureus is called colonization or carriage.      What is Staphylococcus aureus?  Staphylococcus aureus, also known as “staph”, is a germ found on the skin or in the nose of healthy people.  Sometimes Staphylococcus aureus can get into the body and cause an infection.  This can be minor (such as pimples, boils, or other skin problems).  It might also be serious (such as a blood infection, pneumonia, or a surgical site infection).    What is Staphylococcus aureus colonization or carriage?  Colonization or carriage means that a person has the germ but is not sick from it.  These bacteria can be spread on the hands or when breathing or sneezing.    How is Staphylococcus aureus spread?  It is most often spread by close contact with a person or item that carries it.    What happens if my culture is positive for Staphylococcus aureus?  Your doctor/medical team will use this information to guide any antibiotic treatment which may be necessary.  Regardless of the culture results, we will clean the inside of your nose with a betadine swab just before you have your surgery.      Will I get an infection if I have Staphylococcus aureus in my nose or on  my skin?  Anyone can get an infection with Staphylococcus aureus.  However, the best way to reduce your risk of infection is to follow the instructions provided to you for the use of your CHG soap and dental rinse.        Patient Information: Oral/Dental Rinse    What is oral/dental rinse?   It is a mouthwash. It is a way of cleaning the mouth with a germ-killing solution before your surgery.  The solution contains chlorhexidine, commonly known as CHG.   It is used inside the mouth to kill a bacteria known as Staphylococcus aureus.  Let your doctor know if you are allergic to Chlorhexidine.    Why do I need to use CHG oral/dental rinse?  The CHG oral/dental rinse helps to kill a bacteria in your mouth known as Staphylococcus aureus.     This reduces the risk of infection at the surgical site.      Using your CHG oral/dental rinse  STEPS:  Use your CHG oral/dental rinse after you brush your teeth the night before (at bedtime) and the morning of your surgery.  Follow all directions on your prescription label.    Use the cap on the container to measure 15ml   Swish (gargle if you can) the mouthwash in your mouth for at least 30 seconds, (do not swallow) and spit out  After you use your CHG rinse, do not rinse your mouth with water, drink or eat.  Please refer to the prescription label for the appropriate time to resume oral intake      What side effects might I have using the CHG oral/dental rinse?  CHG rinse will stick to plaque on the teeth.  Brush and floss just before use.  Teeth brushing will help avoid staining of plaque during use.      Patient Information: Home Preoperative Antibacterial Shower      What is a home preoperative antibacterial shower?  This shower is a way of cleaning the skin with a germ-killing solution before surgery.  The solution contains chlorhexidine, commonly known as CHG.  CHG is a skin cleanser with germ-killing ability.  Let your doctor know if you are allergic to chlorhexidine.    Why  do I need to take a preoperative antibacterial shower?  Skin is not sterile.  It is best to try to make your skin as free of germs as possible before surgery.  Proper cleansing with a germ-killing soap before surgery can lower the number of germs on your skin.  This helps to reduce the risk of infection at the surgical site.  Following the instructions listed below will help you prepare your skin for surgery.      How do I use the solution?  Steps:  Begin using your CHG soap 5 days before your scheduled surgery on ________________________.    First, wash and rinse your hair using the CHG soap. Keep CHG soap away from ear canals and eyes.  Rinse completely, do not condition.  Hair extensions should be removed.  Wash your face with your normal soap and rinse.    Apply the CHG solution to a clean wet washcloth.  Turn the water off or move away from the water spray to avoid premature rinsing of the CHG soap as you are applying.   Firmly lather your entire body from the neck down.  Do not use on your face.  Pay special attention to the area(s) where your incision(s) will be located unless they are on your face.  Avoid scrubbing your skin too hard.  The important point is to have the CHG soap sit on your skin for 3 minutes.    When the 3 minutes are up, turn on the water and rinse the CHG solution off your body completely.   DO NOT wash with regular soap after you have used the CHG soap solution  Pat yourself dry with a clean, freshly-laundered towel.  DO NOT apply powders, deodorants, or lotions.  Dress in clean, freshly laundered nightclothes.    Be sure to sleep with clean, freshly laundered sheets.  Be aware that CHG will cause stains on fabrics; if you wash them with bleach after use.  Rinse your washcloth and other linens that have contact with CHG completely.  Use only non-chlorine detergents to launder the items used.   The morning of surgery is the fifth day.  Repeat the above steps and dress in clean comfortable  clothing     Whom should I contact if I have any questions regarding the use of CHG soap?  Call the [facility] at [phone] if you have any questions.                    Medication List            Accurate as of February 12, 2025 10:55 AM. Always use your most recent med list.                albuterol 90 mcg/actuation inhaler  INHALE 2 PUFFS BY MOUTH AS NEEDED EVERY FOUR HOURS  Medication Adjustments for Surgery: Take/Use as prescribed     B complex-vitamin C tablet  Additional Medication Adjustments for Surgery: Take last dose 7 days before surgery     biotin 10,000 mcg capsule  Additional Medication Adjustments for Surgery: Take last dose 7 days before surgery     calcium carbonate 200 mg calcium chewable tablet  Commonly known as: Tums  Medication Adjustments for Surgery: Take/Use as prescribed     chlorhexidine 0.12 % solution  Commonly known as: Peridex  Use cap to measure 15 mL.  Swish/gargle mouthwash for at least 30 seconds.  Do not swallow.  Use night before surgery after brushing teeth and morning of surgery after brushing teeth.  Medication Adjustments for Surgery: Take/Use as prescribed     escitalopram 20 mg tablet  Commonly known as: Lexapro  take 1 tablet by mouth once a day  Medication Adjustments for Surgery: Take on the morning of surgery     famotidine 20 mg tablet  Commonly known as: Pepcid  Medication Adjustments for Surgery: Take on the morning of surgery     ferrous sulfate 325 (65 Fe) MG EC tablet  Medication Adjustments for Surgery: Do Not take on the morning of surgery     fluticasone furoate-vilanteroL 200-25 mcg/dose inhaler  Commonly known as: Breo Ellipta  Inhale 1 puff once daily.  Medication Adjustments for Surgery: Take on the morning of surgery     folic acid 400 mcg tablet  Commonly known as: Folvite  Medication Adjustments for Surgery: Do Not take on the morning of surgery     ibuprofen 200 mg  tablet  Additional Medication Adjustments for Surgery: Take last dose 7 days before surgery     levothyroxine 75 mcg tablet  Commonly known as: Synthroid, Levoxyl  take 1 tablet by mouth in the morning on an empty stomach  Medication Adjustments for Surgery: Take on the morning of surgery     loratadine 10 mg capsule  Medication Adjustments for Surgery: Take on the morning of surgery     magnesium 250 mg tablet  Medication Adjustments for Surgery: Do Not take on the morning of surgery     multivitamin tablet  Additional Medication Adjustments for Surgery: Take last dose 7 days before surgery     VITAMIN C ORAL  Additional Medication Adjustments for Surgery: Take last dose 7 days before surgery     Vitamin D3 50 mcg (2,000 unit) capsule  Generic drug: cholecalciferol  Additional Medication Adjustments for Surgery: Take last dose 7 days before surgery     zolpidem 10 mg tablet  Commonly known as: Ambien  Take 1 tablet (10 mg) by mouth as needed at bedtime for sleep.  Notes to patient: Take evening dose before surgery.                                Patient and Family Education             Ways You Can Help Prevent Blood Clots             This handout explains some simple things you can do to help prevent blood clots.      Blood clots are blockages that can form in the body's veins. When a blood clot forms in your deep veins, it may be called a deep vein thrombosis, or DVT for short. Blood clots can happen in any part of the body where blood flows, but they are most common in the arms and legs. If a piece of a blood clot breaks free and travels to the lungs, it is called a pulmonary embolus (PE). A PE can be a very serious problem.         Being in the hospital or having surgery can raise your chances of getting a blood clot because you may not be well enough to move around as much as you normally do.         Ways you can help prevent blood clots in the hospital         Wearing SCDs. SCDs stands for Sequential  Compression Devices.   SCDs are special sleeves that wrap around your legs  They attach to a pump that fills them with air to gently squeeze your legs every few minutes.   This helps return the blood in your legs to your heart.   SCDs should only be taken off when walking or bathing.   SCDs may not be comfortable, but they can help save your life.               Wearing compression stockings - if your doctor orders them. These special snug fitting stockings gently squeeze your legs to help blood flow.       Walking. Walking helps move the blood in your legs.   If your doctor says it is ok, try walking the halls at least   5 times a day. Ask us to help you get up, so you don't fall.      Taking any blood thinning medicines your doctor orders.        Page 1 of 2     [facility]; 3/23   Ways you can help prevent blood clots at home       Wearing compression stockings - if your doctor orders them. ? Walking - to help move the blood in your legs.       Taking any blood thinning medicines your doctor orders.      Signs of a blood clot or PE      Tell your doctor or nurse know right away if you have of the problems listed below.    If you are at home, seek medical care right away. Call 911 for chest pain or problems breathing.               Signs of a blood clot (DVT) - such as pain,  swelling, redness or warmth in your arm or leg      Signs of a pulmonary embolism (PE) - such as chest     pain or feeling short of breath

## 2025-02-14 LAB
ATRIAL RATE: 63 BPM
P AXIS: 77 DEGREES
P OFFSET: 198 MS
P ONSET: 139 MS
PR INTERVAL: 170 MS
Q ONSET: 224 MS
QRS COUNT: 10 BEATS
QRS DURATION: 80 MS
QT INTERVAL: 404 MS
QTC CALCULATION(BAZETT): 413 MS
QTC FREDERICIA: 410 MS
R AXIS: 37 DEGREES
STAPHYLOCOCCUS SPEC CULT: NORMAL
T AXIS: 23 DEGREES
T OFFSET: 426 MS
VENTRICULAR RATE: 63 BPM

## 2025-02-25 ENCOUNTER — ANESTHESIA (OUTPATIENT)
Dept: OPERATING ROOM | Facility: HOSPITAL | Age: 60
End: 2025-02-25
Payer: COMMERCIAL

## 2025-02-25 ENCOUNTER — HOSPITAL ENCOUNTER (OUTPATIENT)
Facility: HOSPITAL | Age: 60
Discharge: HOME | End: 2025-02-26
Attending: ORTHOPAEDIC SURGERY | Admitting: ORTHOPAEDIC SURGERY
Payer: COMMERCIAL

## 2025-02-25 ENCOUNTER — ANESTHESIA EVENT (OUTPATIENT)
Dept: OPERATING ROOM | Facility: HOSPITAL | Age: 60
End: 2025-02-25
Payer: COMMERCIAL

## 2025-02-25 ENCOUNTER — APPOINTMENT (OUTPATIENT)
Dept: RADIOLOGY | Facility: HOSPITAL | Age: 60
End: 2025-02-25
Payer: COMMERCIAL

## 2025-02-25 DIAGNOSIS — M16.12 PRIMARY OSTEOARTHRITIS OF LEFT HIP: Primary | ICD-10-CM

## 2025-02-25 PROCEDURE — 3600000018 HC OR TIME - INITIAL BASE CHARGE - PROCEDURE LEVEL SIX: Performed by: ORTHOPAEDIC SURGERY

## 2025-02-25 PROCEDURE — C1713 ANCHOR/SCREW BN/BN,TIS/BN: HCPCS | Performed by: ORTHOPAEDIC SURGERY

## 2025-02-25 PROCEDURE — 2500000005 HC RX 250 GENERAL PHARMACY W/O HCPCS

## 2025-02-25 PROCEDURE — 7100000011 HC EXTENDED STAY RECOVERY HOURLY - NURSING UNIT

## 2025-02-25 PROCEDURE — 2500000002 HC RX 250 W HCPCS SELF ADMINISTERED DRUGS (ALT 637 FOR MEDICARE OP, ALT 636 FOR OP/ED): Performed by: NURSE PRACTITIONER

## 2025-02-25 PROCEDURE — 2780000003 HC OR 278 NO HCPCS: Performed by: ORTHOPAEDIC SURGERY

## 2025-02-25 PROCEDURE — S4991 NICOTINE PATCH NONLEGEND: HCPCS | Performed by: NURSE PRACTITIONER

## 2025-02-25 PROCEDURE — 2720000007 HC OR 272 NO HCPCS: Performed by: ORTHOPAEDIC SURGERY

## 2025-02-25 PROCEDURE — 7100000001 HC RECOVERY ROOM TIME - INITIAL BASE CHARGE: Performed by: ORTHOPAEDIC SURGERY

## 2025-02-25 PROCEDURE — 2500000005 HC RX 250 GENERAL PHARMACY W/O HCPCS: Performed by: ORTHOPAEDIC SURGERY

## 2025-02-25 PROCEDURE — 64473 LWR XTR FSCL PLN BLK UNI NJX: CPT

## 2025-02-25 PROCEDURE — 7100000002 HC RECOVERY ROOM TIME - EACH INCREMENTAL 1 MINUTE: Performed by: ORTHOPAEDIC SURGERY

## 2025-02-25 PROCEDURE — 2500000004 HC RX 250 GENERAL PHARMACY W/ HCPCS (ALT 636 FOR OP/ED)

## 2025-02-25 PROCEDURE — A4649 SURGICAL SUPPLIES: HCPCS | Performed by: ORTHOPAEDIC SURGERY

## 2025-02-25 PROCEDURE — 72170 X-RAY EXAM OF PELVIS: CPT

## 2025-02-25 PROCEDURE — 2500000004 HC RX 250 GENERAL PHARMACY W/ HCPCS (ALT 636 FOR OP/ED): Performed by: ORTHOPAEDIC SURGERY

## 2025-02-25 PROCEDURE — 2500000001 HC RX 250 WO HCPCS SELF ADMINISTERED DRUGS (ALT 637 FOR MEDICARE OP): Performed by: ORTHOPAEDIC SURGERY

## 2025-02-25 PROCEDURE — 7100000024 HC EXTENDED STAY RECOVERY PER MINUTE- PACU: Performed by: ORTHOPAEDIC SURGERY

## 2025-02-25 PROCEDURE — 2500000004 HC RX 250 GENERAL PHARMACY W/ HCPCS (ALT 636 FOR OP/ED): Performed by: ANESTHESIOLOGY

## 2025-02-25 PROCEDURE — 3600000017 HC OR TIME - EACH INCREMENTAL 1 MINUTE - PROCEDURE LEVEL SIX: Performed by: ORTHOPAEDIC SURGERY

## 2025-02-25 PROCEDURE — 99203 OFFICE O/P NEW LOW 30 MIN: CPT | Performed by: NURSE PRACTITIONER

## 2025-02-25 PROCEDURE — C1776 JOINT DEVICE (IMPLANTABLE): HCPCS | Performed by: ORTHOPAEDIC SURGERY

## 2025-02-25 PROCEDURE — 3700000001 HC GENERAL ANESTHESIA TIME - INITIAL BASE CHARGE: Performed by: ORTHOPAEDIC SURGERY

## 2025-02-25 PROCEDURE — 3700000002 HC GENERAL ANESTHESIA TIME - EACH INCREMENTAL 1 MINUTE: Performed by: ORTHOPAEDIC SURGERY

## 2025-02-25 PROCEDURE — A9999 DME SUPPLY OR ACCESSORY, NOS: HCPCS | Performed by: ORTHOPAEDIC SURGERY

## 2025-02-25 DEVICE — CERAMIC V40 FEMORAL HEAD
Type: IMPLANTABLE DEVICE | Site: HIP | Status: FUNCTIONAL
Brand: BIOLOX

## 2025-02-25 DEVICE — 127 DEGREE NECK ANGLE HIP STEM
Type: IMPLANTABLE DEVICE | Site: HIP | Status: FUNCTIONAL
Brand: ACCOLADE

## 2025-02-25 DEVICE — TRIDENT X3 0 DEGREE POLYETHYLENE INSERT
Type: IMPLANTABLE DEVICE | Site: HIP | Status: FUNCTIONAL
Brand: TRIDENT X3 INSERT

## 2025-02-25 DEVICE — TRIDENT II TRITANIUM CLUSTER 48D
Type: IMPLANTABLE DEVICE | Site: HIP | Status: FUNCTIONAL
Brand: TRIDENT II

## 2025-02-25 DEVICE — 6.5MM LOW PROFILE HEX SCREW 25MM
Type: IMPLANTABLE DEVICE | Site: HIP | Status: FUNCTIONAL
Brand: TRIDENT II

## 2025-02-25 RX ORDER — PREGABALIN 75 MG/1
75 CAPSULE ORAL ONCE
Status: COMPLETED | OUTPATIENT
Start: 2025-02-25 | End: 2025-02-25

## 2025-02-25 RX ORDER — FENTANYL CITRATE 50 UG/ML
50 INJECTION, SOLUTION INTRAMUSCULAR; INTRAVENOUS EVERY 5 MIN PRN
Status: DISCONTINUED | OUTPATIENT
Start: 2025-02-25 | End: 2025-02-25 | Stop reason: HOSPADM

## 2025-02-25 RX ORDER — KETOROLAC TROMETHAMINE 30 MG/ML
15 INJECTION, SOLUTION INTRAMUSCULAR; INTRAVENOUS ONCE
Status: DISCONTINUED | OUTPATIENT
Start: 2025-02-26 | End: 2025-02-26 | Stop reason: HOSPADM

## 2025-02-25 RX ORDER — ESCITALOPRAM OXALATE 20 MG/1
20 TABLET ORAL DAILY
Status: DISCONTINUED | OUTPATIENT
Start: 2025-02-26 | End: 2025-02-26 | Stop reason: HOSPADM

## 2025-02-25 RX ORDER — FLUTICASONE FUROATE AND VILANTEROL 200; 25 UG/1; UG/1
1 POWDER RESPIRATORY (INHALATION) DAILY
Status: DISCONTINUED | OUTPATIENT
Start: 2025-02-26 | End: 2025-02-26 | Stop reason: HOSPADM

## 2025-02-25 RX ORDER — LEVOTHYROXINE SODIUM 75 UG/1
75 TABLET ORAL
Status: DISCONTINUED | OUTPATIENT
Start: 2025-02-26 | End: 2025-02-26 | Stop reason: HOSPADM

## 2025-02-25 RX ORDER — KETOROLAC TROMETHAMINE 30 MG/ML
15 INJECTION, SOLUTION INTRAMUSCULAR; INTRAVENOUS EVERY 6 HOURS PRN
Status: DISCONTINUED | OUTPATIENT
Start: 2025-02-25 | End: 2025-02-26 | Stop reason: HOSPADM

## 2025-02-25 RX ORDER — ALBUTEROL SULFATE 0.83 MG/ML
2.5 SOLUTION RESPIRATORY (INHALATION) ONCE AS NEEDED
Status: DISCONTINUED | OUTPATIENT
Start: 2025-02-25 | End: 2025-02-25 | Stop reason: HOSPADM

## 2025-02-25 RX ORDER — FENTANYL CITRATE 50 UG/ML
50 INJECTION, SOLUTION INTRAMUSCULAR; INTRAVENOUS ONCE AS NEEDED
Status: COMPLETED | OUTPATIENT
Start: 2025-02-25 | End: 2025-02-25

## 2025-02-25 RX ORDER — MIDAZOLAM HYDROCHLORIDE 1 MG/ML
1 INJECTION, SOLUTION INTRAMUSCULAR; INTRAVENOUS ONCE AS NEEDED
Status: DISCONTINUED | OUTPATIENT
Start: 2025-02-25 | End: 2025-02-25 | Stop reason: HOSPADM

## 2025-02-25 RX ORDER — OXYCODONE HCL 20 MG/1
20 TABLET, FILM COATED, EXTENDED RELEASE ORAL ONCE
Status: COMPLETED | OUTPATIENT
Start: 2025-02-25 | End: 2025-02-25

## 2025-02-25 RX ORDER — HYDROMORPHONE HYDROCHLORIDE 2 MG/ML
INJECTION, SOLUTION INTRAMUSCULAR; INTRAVENOUS; SUBCUTANEOUS AS NEEDED
Status: DISCONTINUED | OUTPATIENT
Start: 2025-02-25 | End: 2025-02-25

## 2025-02-25 RX ORDER — ACETAMINOPHEN 500 MG
1000 TABLET ORAL EVERY 6 HOURS
Status: DISCONTINUED | OUTPATIENT
Start: 2025-02-25 | End: 2025-02-26 | Stop reason: HOSPADM

## 2025-02-25 RX ORDER — ACETAMINOPHEN 325 MG/1
650 TABLET ORAL ONCE
Status: COMPLETED | OUTPATIENT
Start: 2025-02-25 | End: 2025-02-25

## 2025-02-25 RX ORDER — ONDANSETRON HYDROCHLORIDE 2 MG/ML
INJECTION, SOLUTION INTRAVENOUS AS NEEDED
Status: DISCONTINUED | OUTPATIENT
Start: 2025-02-25 | End: 2025-02-25

## 2025-02-25 RX ORDER — SODIUM CHLORIDE, SODIUM LACTATE, POTASSIUM CHLORIDE, CALCIUM CHLORIDE 600; 310; 30; 20 MG/100ML; MG/100ML; MG/100ML; MG/100ML
125 INJECTION, SOLUTION INTRAVENOUS CONTINUOUS
Status: DISCONTINUED | OUTPATIENT
Start: 2025-02-25 | End: 2025-02-26 | Stop reason: HOSPADM

## 2025-02-25 RX ORDER — LIDOCAINE HYDROCHLORIDE 10 MG/ML
0.1 INJECTION, SOLUTION INFILTRATION; PERINEURAL ONCE
Status: DISCONTINUED | OUTPATIENT
Start: 2025-02-25 | End: 2025-02-25 | Stop reason: HOSPADM

## 2025-02-25 RX ORDER — FOLIC ACID 0.4 MG
0.4 TABLET ORAL DAILY
Status: DISCONTINUED | OUTPATIENT
Start: 2025-02-26 | End: 2025-02-26 | Stop reason: HOSPADM

## 2025-02-25 RX ORDER — KETOROLAC TROMETHAMINE 30 MG/ML
INJECTION, SOLUTION INTRAMUSCULAR; INTRAVENOUS AS NEEDED
Status: DISCONTINUED | OUTPATIENT
Start: 2025-02-25 | End: 2025-02-25

## 2025-02-25 RX ORDER — BISMUTH SUBSALICYLATE 262 MG
1 TABLET,CHEWABLE ORAL DAILY
Status: DISCONTINUED | OUTPATIENT
Start: 2025-02-25 | End: 2025-02-26 | Stop reason: HOSPADM

## 2025-02-25 RX ORDER — TRANEXAMIC ACID 100 MG/ML
INJECTION, SOLUTION INTRAVENOUS AS NEEDED
Status: DISCONTINUED | OUTPATIENT
Start: 2025-02-25 | End: 2025-02-25

## 2025-02-25 RX ORDER — ASPIRIN 81 MG/1
81 TABLET ORAL 2 TIMES DAILY
Status: DISCONTINUED | OUTPATIENT
Start: 2025-02-25 | End: 2025-02-26 | Stop reason: HOSPADM

## 2025-02-25 RX ORDER — OXYCODONE HYDROCHLORIDE 5 MG/1
5 TABLET ORAL EVERY 4 HOURS PRN
Status: DISCONTINUED | OUTPATIENT
Start: 2025-02-25 | End: 2025-02-26 | Stop reason: HOSPADM

## 2025-02-25 RX ORDER — DOCUSATE SODIUM 100 MG/1
100 CAPSULE, LIQUID FILLED ORAL 2 TIMES DAILY
Status: DISCONTINUED | OUTPATIENT
Start: 2025-02-25 | End: 2025-02-26 | Stop reason: HOSPADM

## 2025-02-25 RX ORDER — ONDANSETRON HYDROCHLORIDE 2 MG/ML
4 INJECTION, SOLUTION INTRAVENOUS EVERY 8 HOURS PRN
Status: DISCONTINUED | OUTPATIENT
Start: 2025-02-25 | End: 2025-02-26 | Stop reason: HOSPADM

## 2025-02-25 RX ORDER — CELECOXIB 200 MG/1
400 CAPSULE ORAL ONCE
Status: COMPLETED | OUTPATIENT
Start: 2025-02-25 | End: 2025-02-25

## 2025-02-25 RX ORDER — OXYCODONE HYDROCHLORIDE 5 MG/1
10 TABLET ORAL EVERY 4 HOURS PRN
Status: DISCONTINUED | OUTPATIENT
Start: 2025-02-25 | End: 2025-02-26 | Stop reason: HOSPADM

## 2025-02-25 RX ORDER — ONDANSETRON HYDROCHLORIDE 2 MG/ML
4 INJECTION, SOLUTION INTRAVENOUS ONCE AS NEEDED
Status: DISCONTINUED | OUTPATIENT
Start: 2025-02-25 | End: 2025-02-25 | Stop reason: HOSPADM

## 2025-02-25 RX ORDER — MEPERIDINE HYDROCHLORIDE 50 MG/ML
12.5 INJECTION INTRAMUSCULAR; INTRAVENOUS; SUBCUTANEOUS EVERY 10 MIN PRN
Status: DISCONTINUED | OUTPATIENT
Start: 2025-02-25 | End: 2025-02-25 | Stop reason: HOSPADM

## 2025-02-25 RX ORDER — MORPHINE SULFATE 2 MG/ML
2 INJECTION, SOLUTION INTRAMUSCULAR; INTRAVENOUS EVERY 2 HOUR PRN
Status: DISCONTINUED | OUTPATIENT
Start: 2025-02-25 | End: 2025-02-26 | Stop reason: HOSPADM

## 2025-02-25 RX ORDER — SODIUM CHLORIDE, SODIUM LACTATE, POTASSIUM CHLORIDE, CALCIUM CHLORIDE 600; 310; 30; 20 MG/100ML; MG/100ML; MG/100ML; MG/100ML
100 INJECTION, SOLUTION INTRAVENOUS CONTINUOUS
Status: ACTIVE | OUTPATIENT
Start: 2025-02-25 | End: 2025-02-25

## 2025-02-25 RX ORDER — PROPOFOL 10 MG/ML
INJECTION, EMULSION INTRAVENOUS AS NEEDED
Status: DISCONTINUED | OUTPATIENT
Start: 2025-02-25 | End: 2025-02-25

## 2025-02-25 RX ORDER — HYDRALAZINE HYDROCHLORIDE 20 MG/ML
5 INJECTION INTRAMUSCULAR; INTRAVENOUS EVERY 30 MIN PRN
Status: DISCONTINUED | OUTPATIENT
Start: 2025-02-25 | End: 2025-02-25 | Stop reason: HOSPADM

## 2025-02-25 RX ORDER — FAMOTIDINE 20 MG/1
20 TABLET, FILM COATED ORAL DAILY
Status: DISCONTINUED | OUTPATIENT
Start: 2025-02-26 | End: 2025-02-26 | Stop reason: HOSPADM

## 2025-02-25 RX ORDER — ROCURONIUM BROMIDE 10 MG/ML
INJECTION, SOLUTION INTRAVENOUS AS NEEDED
Status: DISCONTINUED | OUTPATIENT
Start: 2025-02-25 | End: 2025-02-25

## 2025-02-25 RX ORDER — LIDOCAINE HYDROCHLORIDE 10 MG/ML
INJECTION, SOLUTION INFILTRATION; PERINEURAL AS NEEDED
Status: DISCONTINUED | OUTPATIENT
Start: 2025-02-25 | End: 2025-02-25

## 2025-02-25 RX ORDER — CEFAZOLIN SODIUM 2 G/100ML
2 INJECTION, SOLUTION INTRAVENOUS EVERY 8 HOURS
Status: COMPLETED | OUTPATIENT
Start: 2025-02-25 | End: 2025-02-26

## 2025-02-25 RX ORDER — MIDAZOLAM HYDROCHLORIDE 1 MG/ML
2 INJECTION, SOLUTION INTRAMUSCULAR; INTRAVENOUS ONCE
Status: COMPLETED | OUTPATIENT
Start: 2025-02-25 | End: 2025-02-25

## 2025-02-25 RX ORDER — CEFAZOLIN SODIUM 2 G/100ML
2 INJECTION, SOLUTION INTRAVENOUS ONCE
Status: COMPLETED | OUTPATIENT
Start: 2025-02-25 | End: 2025-02-25

## 2025-02-25 RX ORDER — POLYETHYLENE GLYCOL 3350 17 G/17G
17 POWDER, FOR SOLUTION ORAL DAILY
Status: DISCONTINUED | OUTPATIENT
Start: 2025-02-25 | End: 2025-02-26 | Stop reason: HOSPADM

## 2025-02-25 RX ORDER — PHENYLEPHRINE HCL IN 0.9% NACL 1 MG/10 ML
SYRINGE (ML) INTRAVENOUS AS NEEDED
Status: DISCONTINUED | OUTPATIENT
Start: 2025-02-25 | End: 2025-02-25

## 2025-02-25 RX ORDER — IBUPROFEN 200 MG
1 TABLET ORAL DAILY
Status: DISCONTINUED | OUTPATIENT
Start: 2025-02-25 | End: 2025-02-26 | Stop reason: HOSPADM

## 2025-02-25 RX ORDER — FENTANYL CITRATE 50 UG/ML
INJECTION, SOLUTION INTRAMUSCULAR; INTRAVENOUS AS NEEDED
Status: DISCONTINUED | OUTPATIENT
Start: 2025-02-25 | End: 2025-02-25

## 2025-02-25 RX ADMIN — HYDROMORPHONE HYDROCHLORIDE 0.4 MG: 1 INJECTION, SOLUTION INTRAMUSCULAR; INTRAVENOUS; SUBCUTANEOUS at 13:03

## 2025-02-25 RX ADMIN — FENTANYL CITRATE 50 MCG: 50 INJECTION INTRAMUSCULAR; INTRAVENOUS at 10:08

## 2025-02-25 RX ADMIN — MIDAZOLAM HYDROCHLORIDE 2 MG: 1 INJECTION, SOLUTION INTRAMUSCULAR; INTRAVENOUS at 10:08

## 2025-02-25 RX ADMIN — FENTANYL CITRATE 50 MCG: 50 INJECTION, SOLUTION INTRAMUSCULAR; INTRAVENOUS at 11:07

## 2025-02-25 RX ADMIN — OXYCODONE HYDROCHLORIDE 10 MG: 5 TABLET ORAL at 19:37

## 2025-02-25 RX ADMIN — ROCURONIUM BROMIDE 50 MG: 10 INJECTION, SOLUTION INTRAVENOUS at 10:44

## 2025-02-25 RX ADMIN — CELECOXIB 400 MG: 200 CAPSULE ORAL at 08:54

## 2025-02-25 RX ADMIN — PROPOFOL 10 MG: 10 INJECTION, EMULSION INTRAVENOUS at 11:07

## 2025-02-25 RX ADMIN — SODIUM CHLORIDE, POTASSIUM CHLORIDE, SODIUM LACTATE AND CALCIUM CHLORIDE: 600; 310; 30; 20 INJECTION, SOLUTION INTRAVENOUS at 10:35

## 2025-02-25 RX ADMIN — CEFAZOLIN SODIUM 2 G: 2 INJECTION, SOLUTION INTRAVENOUS at 19:56

## 2025-02-25 RX ADMIN — OXYCODONE HYDROCHLORIDE 20 MG: 20 TABLET, FILM COATED, EXTENDED RELEASE ORAL at 08:54

## 2025-02-25 RX ADMIN — ONDANSETRON HYDROCHLORIDE 4 MG: 2 INJECTION INTRAMUSCULAR; INTRAVENOUS at 11:52

## 2025-02-25 RX ADMIN — NICOTINE 1 PATCH: 21 PATCH, EXTENDED RELEASE TRANSDERMAL at 20:27

## 2025-02-25 RX ADMIN — HYDROMORPHONE HYDROCHLORIDE 0.4 MG: 2 INJECTION INTRAMUSCULAR; INTRAVENOUS; SUBCUTANEOUS at 12:00

## 2025-02-25 RX ADMIN — PROPOFOL 190 MG: 10 INJECTION, EMULSION INTRAVENOUS at 10:43

## 2025-02-25 RX ADMIN — DEXAMETHASONE SODIUM PHOSPHATE 10 MG: 4 INJECTION, SOLUTION INTRAMUSCULAR; INTRAVENOUS at 10:50

## 2025-02-25 RX ADMIN — TRANEXAMIC ACID 2000 MG: 100 INJECTION, SOLUTION INTRAVENOUS at 10:50

## 2025-02-25 RX ADMIN — OXYCODONE HYDROCHLORIDE 10 MG: 5 TABLET ORAL at 23:32

## 2025-02-25 RX ADMIN — HYDROMORPHONE HYDROCHLORIDE 1 MG: 2 INJECTION INTRAMUSCULAR; INTRAVENOUS; SUBCUTANEOUS at 12:38

## 2025-02-25 RX ADMIN — HYDROMORPHONE HYDROCHLORIDE 0.4 MG: 1 INJECTION, SOLUTION INTRAMUSCULAR; INTRAVENOUS; SUBCUTANEOUS at 12:47

## 2025-02-25 RX ADMIN — LIDOCAINE HYDROCHLORIDE 50 MG: 10 INJECTION, SOLUTION INFILTRATION; PERINEURAL at 10:43

## 2025-02-25 RX ADMIN — FENTANYL CITRATE 50 MCG: 50 INJECTION, SOLUTION INTRAMUSCULAR; INTRAVENOUS at 10:43

## 2025-02-25 RX ADMIN — HYDROMORPHONE HYDROCHLORIDE 0.2 MG: 2 INJECTION INTRAMUSCULAR; INTRAVENOUS; SUBCUTANEOUS at 11:21

## 2025-02-25 RX ADMIN — DOCUSATE SODIUM 100 MG: 100 CAPSULE, LIQUID FILLED ORAL at 20:00

## 2025-02-25 RX ADMIN — HYDROMORPHONE HYDROCHLORIDE 0.4 MG: 2 INJECTION INTRAMUSCULAR; INTRAVENOUS; SUBCUTANEOUS at 12:13

## 2025-02-25 RX ADMIN — ACETAMINOPHEN 1000 MG: 500 TABLET, FILM COATED ORAL at 19:37

## 2025-02-25 RX ADMIN — KETOROLAC TROMETHAMINE 30 MG: 30 INJECTION, SOLUTION INTRAMUSCULAR at 11:58

## 2025-02-25 RX ADMIN — CEFAZOLIN SODIUM 2 G: 2 INJECTION, SOLUTION INTRAVENOUS at 10:49

## 2025-02-25 RX ADMIN — SUGAMMADEX 200 MG: 100 INJECTION, SOLUTION INTRAVENOUS at 12:05

## 2025-02-25 RX ADMIN — PREGABALIN 75 MG: 75 CAPSULE ORAL at 08:54

## 2025-02-25 RX ADMIN — ASPIRIN 81 MG: 81 TABLET, COATED ORAL at 20:00

## 2025-02-25 RX ADMIN — FENTANYL CITRATE 50 MCG: 50 INJECTION INTRAMUSCULAR; INTRAVENOUS at 13:12

## 2025-02-25 RX ADMIN — FENTANYL CITRATE 50 MCG: 50 INJECTION INTRAMUSCULAR; INTRAVENOUS at 13:22

## 2025-02-25 RX ADMIN — POLYETHYLENE GLYCOL 3350 17 G: 17 POWDER, FOR SOLUTION ORAL at 19:47

## 2025-02-25 RX ADMIN — POVIDONE-IODINE 1 APPLICATION: 5 SOLUTION TOPICAL at 08:53

## 2025-02-25 RX ADMIN — ACETAMINOPHEN 650 MG: 325 TABLET ORAL at 08:54

## 2025-02-25 RX ADMIN — SODIUM CHLORIDE, SODIUM LACTATE, POTASSIUM CHLORIDE, AND CALCIUM CHLORIDE 125 ML/HR: 600; 310; 30; 20 INJECTION, SOLUTION INTRAVENOUS at 19:47

## 2025-02-25 RX ADMIN — Medication 100 MCG: at 11:48

## 2025-02-25 RX ADMIN — THERA TABS 1 TABLET: TAB at 19:37

## 2025-02-25 SDOH — SOCIAL STABILITY: SOCIAL INSECURITY
WITHIN THE LAST YEAR, HAVE YOU BEEN RAPED OR FORCED TO HAVE ANY KIND OF SEXUAL ACTIVITY BY YOUR PARTNER OR EX-PARTNER?: NO

## 2025-02-25 SDOH — SOCIAL STABILITY: SOCIAL INSECURITY: ABUSE: ADULT

## 2025-02-25 SDOH — ECONOMIC STABILITY: FOOD INSECURITY: WITHIN THE PAST 12 MONTHS, THE FOOD YOU BOUGHT JUST DIDN'T LAST AND YOU DIDN'T HAVE MONEY TO GET MORE.: NEVER TRUE

## 2025-02-25 SDOH — SOCIAL STABILITY: SOCIAL NETWORK
DO YOU BELONG TO ANY CLUBS OR ORGANIZATIONS SUCH AS CHURCH GROUPS, UNIONS, FRATERNAL OR ATHLETIC GROUPS, OR SCHOOL GROUPS?: NO

## 2025-02-25 SDOH — SOCIAL STABILITY: SOCIAL INSECURITY: ARE YOU MARRIED, WIDOWED, DIVORCED, SEPARATED, NEVER MARRIED, OR LIVING WITH A PARTNER?: MARRIED

## 2025-02-25 SDOH — HEALTH STABILITY: MENTAL HEALTH: CURRENT SMOKER: 0

## 2025-02-25 SDOH — HEALTH STABILITY: PHYSICAL HEALTH
HOW OFTEN DO YOU NEED TO HAVE SOMEONE HELP YOU WHEN YOU READ INSTRUCTIONS, PAMPHLETS, OR OTHER WRITTEN MATERIAL FROM YOUR DOCTOR OR PHARMACY?: NEVER

## 2025-02-25 SDOH — SOCIAL STABILITY: SOCIAL NETWORK: HOW OFTEN DO YOU ATTEND MEETINGS OF THE CLUBS OR ORGANIZATIONS YOU BELONG TO?: NEVER

## 2025-02-25 SDOH — SOCIAL STABILITY: SOCIAL NETWORK: HOW OFTEN DO YOU ATTEND CHURCH OR RELIGIOUS SERVICES?: NEVER

## 2025-02-25 SDOH — ECONOMIC STABILITY: FOOD INSECURITY: WITHIN THE PAST 12 MONTHS, YOU WORRIED THAT YOUR FOOD WOULD RUN OUT BEFORE YOU GOT THE MONEY TO BUY MORE.: NEVER TRUE

## 2025-02-25 SDOH — SOCIAL STABILITY: SOCIAL INSECURITY
WITHIN THE LAST YEAR, HAVE YOU BEEN KICKED, HIT, SLAPPED, OR OTHERWISE PHYSICALLY HURT BY YOUR PARTNER OR EX-PARTNER?: NO

## 2025-02-25 SDOH — SOCIAL STABILITY: SOCIAL INSECURITY: WITHIN THE LAST YEAR, HAVE YOU BEEN HUMILIATED OR EMOTIONALLY ABUSED IN OTHER WAYS BY YOUR PARTNER OR EX-PARTNER?: NO

## 2025-02-25 SDOH — ECONOMIC STABILITY: INCOME INSECURITY: IN THE PAST 12 MONTHS HAS THE ELECTRIC, GAS, OIL, OR WATER COMPANY THREATENED TO SHUT OFF SERVICES IN YOUR HOME?: NO

## 2025-02-25 SDOH — SOCIAL STABILITY: SOCIAL INSECURITY: WITHIN THE LAST YEAR, HAVE YOU BEEN AFRAID OF YOUR PARTNER OR EX-PARTNER?: NO

## 2025-02-25 SDOH — HEALTH STABILITY: PHYSICAL HEALTH: ON AVERAGE, HOW MANY DAYS PER WEEK DO YOU ENGAGE IN MODERATE TO STRENUOUS EXERCISE (LIKE A BRISK WALK)?: 0 DAYS

## 2025-02-25 SDOH — HEALTH STABILITY: MENTAL HEALTH: HOW OFTEN DO YOU HAVE SIX OR MORE DRINKS ON ONE OCCASION?: NEVER

## 2025-02-25 SDOH — HEALTH STABILITY: MENTAL HEALTH: HOW OFTEN DO YOU HAVE A DRINK CONTAINING ALCOHOL?: NEVER

## 2025-02-25 SDOH — HEALTH STABILITY: MENTAL HEALTH
DO YOU FEEL STRESS - TENSE, RESTLESS, NERVOUS, OR ANXIOUS, OR UNABLE TO SLEEP AT NIGHT BECAUSE YOUR MIND IS TROUBLED ALL THE TIME - THESE DAYS?: NOT AT ALL

## 2025-02-25 SDOH — SOCIAL STABILITY: SOCIAL INSECURITY: DOES ANYONE TRY TO KEEP YOU FROM HAVING/CONTACTING OTHER FRIENDS OR DOING THINGS OUTSIDE YOUR HOME?: NO

## 2025-02-25 SDOH — SOCIAL STABILITY: SOCIAL INSECURITY: DO YOU FEEL UNSAFE GOING BACK TO THE PLACE WHERE YOU ARE LIVING?: NO

## 2025-02-25 SDOH — SOCIAL STABILITY: SOCIAL INSECURITY: HAVE YOU HAD ANY THOUGHTS OF HARMING ANYONE ELSE?: NO

## 2025-02-25 SDOH — SOCIAL STABILITY: SOCIAL INSECURITY: HAS ANYONE EVER THREATENED TO HURT YOUR FAMILY OR YOUR PETS?: NO

## 2025-02-25 SDOH — SOCIAL STABILITY: SOCIAL INSECURITY: WERE YOU ABLE TO COMPLETE ALL THE BEHAVIORAL HEALTH SCREENINGS?: YES

## 2025-02-25 SDOH — SOCIAL STABILITY: SOCIAL NETWORK: HOW OFTEN DO YOU GET TOGETHER WITH FRIENDS OR RELATIVES?: MORE THAN THREE TIMES A WEEK

## 2025-02-25 SDOH — HEALTH STABILITY: MENTAL HEALTH: HOW MANY DRINKS CONTAINING ALCOHOL DO YOU HAVE ON A TYPICAL DAY WHEN YOU ARE DRINKING?: PATIENT DOES NOT DRINK

## 2025-02-25 SDOH — SOCIAL STABILITY: SOCIAL INSECURITY: HAVE YOU HAD THOUGHTS OF HARMING ANYONE ELSE?: NO

## 2025-02-25 SDOH — SOCIAL STABILITY: SOCIAL NETWORK
IN A TYPICAL WEEK, HOW MANY TIMES DO YOU TALK ON THE PHONE WITH FAMILY, FRIENDS, OR NEIGHBORS?: MORE THAN THREE TIMES A WEEK

## 2025-02-25 SDOH — HEALTH STABILITY: PHYSICAL HEALTH: ON AVERAGE, HOW MANY MINUTES DO YOU ENGAGE IN EXERCISE AT THIS LEVEL?: 0 MIN

## 2025-02-25 SDOH — SOCIAL STABILITY: SOCIAL INSECURITY: ARE THERE ANY APPARENT SIGNS OF INJURIES/BEHAVIORS THAT COULD BE RELATED TO ABUSE/NEGLECT?: NO

## 2025-02-25 SDOH — SOCIAL STABILITY: SOCIAL INSECURITY: DO YOU FEEL ANYONE HAS EXPLOITED OR TAKEN ADVANTAGE OF YOU FINANCIALLY OR OF YOUR PERSONAL PROPERTY?: NO

## 2025-02-25 SDOH — SOCIAL STABILITY: SOCIAL INSECURITY: ARE YOU OR HAVE YOU BEEN THREATENED OR ABUSED PHYSICALLY, EMOTIONALLY, OR SEXUALLY BY ANYONE?: NO

## 2025-02-25 ASSESSMENT — PAIN DESCRIPTION - LOCATION
LOCATION: HIP

## 2025-02-25 ASSESSMENT — PAIN DESCRIPTION - DESCRIPTORS
DESCRIPTORS: THROBBING
DESCRIPTORS: SORE
DESCRIPTORS: THROBBING
DESCRIPTORS: SORE
DESCRIPTORS: SORE
DESCRIPTORS: THROBBING
DESCRIPTORS: SORE

## 2025-02-25 ASSESSMENT — PAIN DESCRIPTION - ORIENTATION
ORIENTATION: LEFT

## 2025-02-25 ASSESSMENT — PAIN SCALES - GENERAL
PAINLEVEL_OUTOF10: 5 - MODERATE PAIN
PAINLEVEL_OUTOF10: 4
PAINLEVEL_OUTOF10: 5 - MODERATE PAIN
PAINLEVEL_OUTOF10: 0 - NO PAIN
PAINLEVEL_OUTOF10: 7
PAINLEVEL_OUTOF10: 5 - MODERATE PAIN
PAINLEVEL_OUTOF10: 8
PAINLEVEL_OUTOF10: 6
PAINLEVEL_OUTOF10: 8
PAINLEVEL_OUTOF10: 4
PAINLEVEL_OUTOF10: 8
PAINLEVEL_OUTOF10: 4
PAIN_LEVEL: 2
PAINLEVEL_OUTOF10: 8

## 2025-02-25 ASSESSMENT — COGNITIVE AND FUNCTIONAL STATUS - GENERAL
HELP NEEDED FOR BATHING: A LITTLE
TURNING FROM BACK TO SIDE WHILE IN FLAT BAD: A LITTLE
DRESSING REGULAR LOWER BODY CLOTHING: A LITTLE
MOVING FROM LYING ON BACK TO SITTING ON SIDE OF FLAT BED WITH BEDRAILS: A LITTLE
MOBILITY SCORE: 19
TOILETING: A LITTLE
DRESSING REGULAR LOWER BODY CLOTHING: A LITTLE
DAILY ACTIVITIY SCORE: 21
WALKING IN HOSPITAL ROOM: A LITTLE
MOBILITY SCORE: 17
PATIENT BASELINE BEDBOUND: NO
MOVING TO AND FROM BED TO CHAIR: A LITTLE
TOILETING: A LITTLE
HELP NEEDED FOR BATHING: A LITTLE
STANDING UP FROM CHAIR USING ARMS: A LITTLE
MOVING TO AND FROM BED TO CHAIR: A LITTLE
DAILY ACTIVITIY SCORE: 21
STANDING UP FROM CHAIR USING ARMS: A LITTLE
CLIMB 3 TO 5 STEPS WITH RAILING: A LOT
WALKING IN HOSPITAL ROOM: A LITTLE
CLIMB 3 TO 5 STEPS WITH RAILING: A LOT

## 2025-02-25 ASSESSMENT — PAIN - FUNCTIONAL ASSESSMENT
PAIN_FUNCTIONAL_ASSESSMENT: FLACC (FACE, LEGS, ACTIVITY, CRY, CONSOLABILITY)
PAIN_FUNCTIONAL_ASSESSMENT: 0-10
PAIN_FUNCTIONAL_ASSESSMENT: FLACC (FACE, LEGS, ACTIVITY, CRY, CONSOLABILITY)
PAIN_FUNCTIONAL_ASSESSMENT: 0-10

## 2025-02-25 ASSESSMENT — ACTIVITIES OF DAILY LIVING (ADL)
PATIENT'S MEMORY ADEQUATE TO SAFELY COMPLETE DAILY ACTIVITIES?: YES
HEARING - LEFT EAR: FUNCTIONAL
ADEQUATE_TO_COMPLETE_ADL: YES
LACK_OF_TRANSPORTATION: NO
HEARING - RIGHT EAR: FUNCTIONAL
ASSISTIVE_DEVICE: WALKER
TOILETING: NEEDS ASSISTANCE
DRESSING YOURSELF: NEEDS ASSISTANCE
JUDGMENT_ADEQUATE_SAFELY_COMPLETE_DAILY_ACTIVITIES: YES
FEEDING YOURSELF: INDEPENDENT
GROOMING: INDEPENDENT
WALKS IN HOME: NEEDS ASSISTANCE
BATHING: NEEDS ASSISTANCE

## 2025-02-25 ASSESSMENT — COLUMBIA-SUICIDE SEVERITY RATING SCALE - C-SSRS
6. HAVE YOU EVER DONE ANYTHING, STARTED TO DO ANYTHING, OR PREPARED TO DO ANYTHING TO END YOUR LIFE?: NO
6. HAVE YOU EVER DONE ANYTHING, STARTED TO DO ANYTHING, OR PREPARED TO DO ANYTHING TO END YOUR LIFE?: NO
1. IN THE PAST MONTH, HAVE YOU WISHED YOU WERE DEAD OR WISHED YOU COULD GO TO SLEEP AND NOT WAKE UP?: NO
2. HAVE YOU ACTUALLY HAD ANY THOUGHTS OF KILLING YOURSELF?: NO
2. HAVE YOU ACTUALLY HAD ANY THOUGHTS OF KILLING YOURSELF?: NO
1. IN THE PAST MONTH, HAVE YOU WISHED YOU WERE DEAD OR WISHED YOU COULD GO TO SLEEP AND NOT WAKE UP?: NO

## 2025-02-25 ASSESSMENT — PATIENT HEALTH QUESTIONNAIRE - PHQ9
2. FEELING DOWN, DEPRESSED OR HOPELESS: NOT AT ALL
SUM OF ALL RESPONSES TO PHQ9 QUESTIONS 1 & 2: 0
1. LITTLE INTEREST OR PLEASURE IN DOING THINGS: NOT AT ALL

## 2025-02-25 ASSESSMENT — LIFESTYLE VARIABLES
SUBSTANCE_ABUSE_PAST_12_MONTHS: NO
HOW MANY STANDARD DRINKS CONTAINING ALCOHOL DO YOU HAVE ON A TYPICAL DAY: PATIENT DOES NOT DRINK
HOW OFTEN DO YOU HAVE 6 OR MORE DRINKS ON ONE OCCASION: NEVER
AUDIT-C TOTAL SCORE: 0
PRESCIPTION_ABUSE_PAST_12_MONTHS: NO
SKIP TO QUESTIONS 9-10: 1

## 2025-02-25 NOTE — DISCHARGE INSTRUCTIONS
Orthopaedic Surgery:  Umer Cruz MD was your surgeon this visit.    Dressing: remove after 1 week. Call if any drainage is coming out of the bandage.  Compression socks: wear on both legs for 12 hours a day each day for 4 weeks after surgery.  Weight bearing: weight bear as tolerated  Showering: may shower  Bathing: no tub bathing or pools  Driving: no driving    Call with any concerns.

## 2025-02-25 NOTE — ANESTHESIA PROCEDURE NOTES
Airway  Date/Time: 2/25/2025 10:46 AM  Urgency: elective    Airway not difficult    Staffing  Performed: BAR   Authorized by: Cody Urbina MD    Performed by: BAR Maria  Patient location during procedure: OR    Indications and Patient Condition  Indications for airway management: anesthesia  Spontaneous Ventilation: absent  Sedation level: deep  Preoxygenated: yes  Patient position: sniffing  Mask difficulty assessment: 2 - vent by mask + OA or adjuvant +/- NMBA    Final Airway Details  Final airway type: endotracheal airway      Successful airway: ETT  Cuffed: yes   Successful intubation technique: direct laryngoscopy  Facilitating devices/methods: intubating stylet  Endotracheal tube insertion site: oral  Blade: Saqib  Blade size: #3  ETT size (mm): 7.0  Cormack-Lehane Classification: grade I - full view of glottis  Placement verified by: capnometry   Measured from: lips  ETT to lips (cm): 20  Number of attempts at approach: 1

## 2025-02-25 NOTE — NURSING NOTE
Patient admitted from PACU to 448 s/p left total hip, see admission assessment guide, oriented pt to room and call light

## 2025-02-25 NOTE — ANESTHESIA PROCEDURE NOTES
Peripheral Block    Patient location during procedure: pre-op  Start time: 2/25/2025 10:15 AM  End time: 2/25/2025 10:16 AM  Reason for block: at surgeon's request and post-op pain management  Staffing  Performed: attending   Authorized by: Cody Urbina MD    Performed by: Cody Urbina MD  Preanesthetic Checklist  Completed: patient identified, IV checked, site marked, risks and benefits discussed, surgical consent, monitors and equipment checked, pre-op evaluation and timeout performed   Timeout performed at: 2/25/2025 10:08 AM  Peripheral Block  Patient position: laying flat  Prep: ChloraPrep  Patient monitoring: heart rate, cardiac monitor and continuous pulse ox  Block type: PENG  Laterality: left  Injection technique: single-shot  Guidance: ultrasound guided  Needle  Needle type: short-bevel   Needle gauge: 21 G  Needle length: 10 cm  Needle localization: ultrasound guidance  Assessment  Injection assessment: negative aspiration for heme, no paresthesia on injection, incremental injection and local visualized surrounding nerve on ultrasound  Paresthesia pain: none  Heart rate change: no  Slow fractionated injection: no

## 2025-02-25 NOTE — ANESTHESIA POSTPROCEDURE EVALUATION
"Patient: Sonal Tyson \"Audi\"    Procedure Summary       Date: 02/25/25 Room / Location: [facility] 05 / [facility]    Anesthesia Start: 1036 Anesthesia Stop: 1238    Procedure: Total Hip Arthroplasty (Left: Hip) Diagnosis:       Primary osteoarthritis of left hip      (LEFT HIP OSTEOARTHRIITIS)    Surgeons: Umer Cruz MD Responsible Provider: Cody Urbina MD    Anesthesia Type: general ASA Status: 2            Anesthesia Type: general    Vitals Value Taken Time   /69 02/25/25 1306   Temp 36.6 °C (97.9 °F) 02/25/25 1232   Pulse 75 02/25/25 1309   Resp 8 02/25/25 1309   SpO2 97 % 02/25/25 1309   Vitals shown include unfiled device data.    Anesthesia Post Evaluation    Patient location during evaluation: PACU  Patient participation: complete - patient participated  Level of consciousness: sleepy but conscious  Pain score: 2  Pain management: adequate  Multimodal analgesia pain management approach  Airway patency: patent  Cardiovascular status: acceptable  Respiratory status: acceptable  Hydration status: acceptable  Postoperative Nausea and Vomiting: none        No notable events documented.    "
Age appropriate

## 2025-02-25 NOTE — OP NOTE
"Total Hip Arthroplasty  [address] - [place] ARTHRP ACETBLR/PROX FEM PROSTC AGRFT/ALGRFT   Operative Note     Date: 2025  OR Location: [facility]    Name: Sonal Tyson \"Nicola", : 1965, Age: 59 y.o., MRN: [id number], Sex: female    PRE Diagnosis  Left hip osteoarthritis    POST Diagnosis  Same    Procedures  Left total hip arthroplasty    Surgeons      * Umer Cruz - Primary    Resident/Fellow/Other Assistant:  erik Desir    The above-named assistant(s) was/were integral to all portions of the procedure including patient positioning, exposure, implantation, closure and transportation.     Procedure Summary  Implants:  Styker Total Hip: Trident 2 Acetabular Component 48; X3 Acetabular Liner Size 32-D, 0 degree; Accolade 2 femoral stem size 4-127, Biolox delta femoral head size 32 +0     Anesthesia: GETA with [place] block  ASA: II  Anesthesia Staff:   Anesthesiologist: Cody Urbina MD  C-AA: BAR Maria  Frontline Breaker: BAR Garcia  Estimated Blood Loss: 175 mL    Specimen: No specimens collected     Staff:   Circulator: Luis Daniel Matson RN  Scrub Person: Cornelio Everett         Drains and/or Catheters:   None    Tourniquet Times:                Findings: Severe degenerative change of the hip joint space bone-on-bone apposition osteophyte formation.  Stable implantation of the above components.  Full extension external rotation without impingement.  Hip flexion in neutral plane 210 degrees.  At position hip flexion 45 adduction 20 I can internally rotate approximately 50 degrees.  There is no impingement.  This appeared to restore leg lengths.    Indications: Sonal Tyson \"Nicola" is an 59 y.o. female who is having surgery for LEFT HIP OSTEOARTHRIITIS.   Presented with pain and symptoms of her left leg knee and hip.  Previous history of knee replacement and this was functioning well.  Left hip diagnosed with osteoarthritis.  Failed conservative care.  Affecting daily activities. "  She had reached a point of disability.  Work responsibilities coming more difficult.  We discussed further treatment and she wished to proceed with a hip replacement surgery.  X-rays of the pelvis revealed severe degenerative change bone-on-bone apposition osteophyte formation.  I discussed with the patient the risks, benefits, alternatives, complications of a total hip replacement.  The risks, including but not limited to, infection, deep vein thrombosis, pulmonary embolism, recurring dislocation, leg-length discrepancy, periprosthetic fracture, damage to ligaments tendons neurovascular structures were discussed.  Despite these risks they elected to proceed.      Procedure Details: Medications: Ancef 2 g IV, tranexamic acid 2 g IV, Decadron 10 milligrams IV    Patient was seen and evaluated in the preoperative area.  The left leg was marked as the operative extremity.  They were then brought back to the operating room.  Anesthesia controlled the head neck airway and administered a general anesthetic.  I positioned them in the lateral decubitus position on a pegboard.  All bony prominences were well padded.  I proceeded to wash the leg with chlorhexidine and alcohol.  I then prepped with a ChloraPrep.  We then draped in the usual fashion.  An operative time-out was performed.    I marked out the lateral trochanteric margin as well as a curvilinear incision in line with the Kocher Langenbach approach to the hip joint.  I proceeded to make the incision with the scalpel and carried down the dissection with the Bovie to the level of the fascia.  I incised the fascia in line with the skin incision.  I bluntly split the fibers of the gluteus lit.  I palpated the posterior flap and inserted a Charnley retractor.  I then extended and internally rotated the hip joint.  I identified the abductor tendon protected this with a narrow Harrison.  I then released the short external rotators from the posterior edge of the femur  including the capsule.  I then T'd the capsule to the level of the piriformis.  I was able to atraumatically dislocate the head.  I brought this into the incision with a femoral neck elevator.  I palpated the lesser trochanter marked out an approximate osteotomy site based off of preoperative templating.  I protected the soft tissues with a Hohmann retractor.  I then used an oscillating saw to resect the femoral head.  I removed the head with a tenaculum.  I sized it with an outer diameter of 44 millimeters.  I proceeded now to retract the femur anteriorly with a curved anterior Hohmann.  Placed a straight Hohmann retractor posteriorly.  I released the inferior capsule.  I removed the labrum circumferentially and removed the ligamentum teres as well to identify the floor of the acetabulum.  I then sequentially reamed from a size 44 millimeter Reamer to a size 48 millimeter Reamer.  I felt the last Reamer had good anterior posterior fit in the acetabulum.  There is good bleeding cancellous bone.  This was thoroughly irrigated at this time.  I then selected the acetabular component and aligned it with the screw holes in the posterior superior quadrant.  I impacted in approximate position of 40 degrees of abduction, 15 degrees of anteversion.  The component was seated fully and checked with a tonsil.  I then placed 1 cancellous screw in the posterior superior quadrant palpating the drill hole prior to insertion.  I placed a trial acetabular liner returned to the femur at this time.    I brought the femur back into the incision with a femoral neck elevator.  I debrided the piriformis fossa and then used a box osteotome and a canal finder to enter the proximal femur.  I then sequentially broached from a size 0 to a size 4. I felt the last size broach had good stability to both axial and rotational loading in the proximal femur.  I now selected to 127 degree neck shaft angle trial component with a +0 neck length head.  I  did a trial reduction.  I was satisfied with the initial trialing and component positioning.      Peripheral osteophytes along the anterior inferior and posterior aspect debrided with osteotome and pituitary rongeur    I now retracted the femur anteriorly.  I placed a curved anterior Hohmann retractor to hold position.  I removed the acetabular trial liner and cleaned the locking mechanism.  I then irrigated.  I then seated the trident X 3 0 degree polyethylene insert.  The fit was secure circumferentially.  I now returned to the femur.  I checked the fit of the broach and felt that there was still good rotational stability.  I removed this and thoroughly irrigated the femoral canal.  There was good cortical contact medially.  There is no apparent fracture of the calcar.  I selected the Accolade 2 127 degree neck shaft angle size 4 femoral component.  I impacted to the same level as the broach.  I then trialed with a 0 millimeter femoral head and felt that this best restored the leg lengths as well as stability with range of motion.  I removed the head trial and cleaned the trunnion.  I then impacted the femoral head component.  The fit was secure.  I did a final reduction at this time.  I tested the hip kinematics and was satisfied with range of motion and stability as noted above.      I now thoroughly irrigated with a diluted Betadine solution.  I washed this out with normal saline.  I obtained hemostasis.  I then did a posterior capsular closure through bone drill holes with #2 FiberWire suture.  The repair was excellent.  I then removed the Charnley retractors.  I irrigated and then closed the fascia with #2 Ethibond suture for a watertight seal.  I then irrigated the subcutaneous tissue closed in a layered fashion with a running 0 Vicryl suture, buried interrupted 2-0 Vicryl, running Monocryl 4-0 suture.  Dermabond and a silver impregnated dressing were applied.  They tolerated the procedure well and were  awakened and taken to PACU in stable condition.    Complications:  None; patient tolerated the procedure well.    Disposition: PACU - hemodynamically stable.  Condition: stable             Attending Attestation: I performed the procedure.    Umer Cruz  Phone Number: [phone]

## 2025-02-25 NOTE — CARE PLAN
Problem: Pain - Adult  Goal: Verbalizes/displays adequate comfort level or baseline comfort level  Outcome: Progressing     Problem: Safety - Adult  Goal: Free from fall injury  Outcome: Progressing   The patient's goals for the shift include pain management    The clinical goals for the shift include pain management

## 2025-02-25 NOTE — ANESTHESIA PREPROCEDURE EVALUATION
"Patient: Sonal Tyson \"Audi\"    Procedure Information       Date/Time: 02/25/25 1015    Procedure: Total Hip Arthroplasty (Left: Hip) - ERIN/ PERIPHERAL NERVE BLOCK    Location: [facility] / [facility]    Surgeons: Umer Cruz MD            Relevant Problems   Cardiac   (+) Pure hyperglyceridemia      Pulmonary   (+) Asthma      Neuro   (+) Anxiety      GI   (+) Gastroesophageal reflux disease      Endocrine   (+) Hypothyroidism      Musculoskeletal   (+) Osteoarthritis of knee       Clinical information reviewed:   Tobacco  Allergies  Meds   Med Hx  Surg Hx  OB Status  Fam Hx  Soc   Hx        NPO Detail:  NPO/Void Status  Date of Last Liquid: 02/25/25  Time of Last Liquid: 0730  Date of Last Solid: 02/24/25  Time of Last Solid: 1800  Time of Last Void: 0700         Physical Exam    Airway  Mallampati: II  TM distance: >3 FB  Neck ROM: full     Cardiovascular - normal exam     Dental - normal exam     Pulmonary - normal exam     Abdominal - normal exam             Anesthesia Plan    History of general anesthesia?: yes  History of complications of general anesthesia?: no    ASA 2     general     The patient is not a current smoker.  Patient was not previously instructed to abstain from smoking on day of procedure.  Patient did not smoke on day of procedure.  Education provided regarding risk of obstructive sleep apnea.  intravenous induction   Postoperative administration of opioids is intended.  Trial extubation is planned.  Anesthetic plan and risks discussed with patient.  Use of blood products discussed with patient who consented to blood products.    Plan discussed with CAA, attending and CRNA.      "

## 2025-02-26 ENCOUNTER — HOME HEALTH ADMISSION (OUTPATIENT)
Dept: HOME HEALTH SERVICES | Facility: HOME HEALTH | Age: 60
End: 2025-02-26
Payer: COMMERCIAL

## 2025-02-26 ENCOUNTER — PHARMACY VISIT (OUTPATIENT)
Dept: PHARMACY | Facility: CLINIC | Age: 60
End: 2025-02-26
Payer: COMMERCIAL

## 2025-02-26 ENCOUNTER — DOCUMENTATION (OUTPATIENT)
Dept: HOME HEALTH SERVICES | Facility: HOME HEALTH | Age: 60
End: 2025-02-26
Payer: COMMERCIAL

## 2025-02-26 VITALS
RESPIRATION RATE: 18 BRPM | WEIGHT: 149.91 LBS | TEMPERATURE: 97.5 F | OXYGEN SATURATION: 96 % | HEIGHT: 64 IN | BODY MASS INDEX: 25.59 KG/M2 | SYSTOLIC BLOOD PRESSURE: 125 MMHG | DIASTOLIC BLOOD PRESSURE: 58 MMHG | HEART RATE: 62 BPM

## 2025-02-26 LAB
ANION GAP SERPL CALCULATED.3IONS-SCNC: 11 MMOL/L (ref 10–20)
BUN SERPL-MCNC: 11 MG/DL (ref 6–23)
CALCIUM SERPL-MCNC: 8.1 MG/DL (ref 8.6–10.3)
CHLORIDE SERPL-SCNC: 104 MMOL/L (ref 98–107)
CO2 SERPL-SCNC: 25 MMOL/L (ref 21–32)
CREAT SERPL-MCNC: 0.63 MG/DL (ref 0.5–1.05)
EGFRCR SERPLBLD CKD-EPI 2021: >90 ML/MIN/1.73M*2
ERYTHROCYTE [DISTWIDTH] IN BLOOD BY AUTOMATED COUNT: 13.2 % (ref 11.5–14.5)
GLUCOSE SERPL-MCNC: 173 MG/DL (ref 74–99)
HCT VFR BLD AUTO: 37.2 % (ref 36–46)
HGB BLD-MCNC: 12 G/DL (ref 12–16)
MCH RBC QN AUTO: 30.8 PG (ref 26–34)
MCHC RBC AUTO-ENTMCNC: 32.3 G/DL (ref 32–36)
MCV RBC AUTO: 95 FL (ref 80–100)
NRBC BLD-RTO: 0 /100 WBCS (ref 0–0)
PLATELET # BLD AUTO: 142 X10*3/UL (ref 150–450)
POTASSIUM SERPL-SCNC: 3.3 MMOL/L (ref 3.5–5.3)
RBC # BLD AUTO: 3.9 X10*6/UL (ref 4–5.2)
SODIUM SERPL-SCNC: 137 MMOL/L (ref 136–145)
WBC # BLD AUTO: 7.7 X10*3/UL (ref 4.4–11.3)

## 2025-02-26 PROCEDURE — 80048 BASIC METABOLIC PNL TOTAL CA: CPT | Performed by: ORTHOPAEDIC SURGERY

## 2025-02-26 PROCEDURE — 36415 COLL VENOUS BLD VENIPUNCTURE: CPT | Performed by: ORTHOPAEDIC SURGERY

## 2025-02-26 PROCEDURE — 7100000011 HC EXTENDED STAY RECOVERY HOURLY - NURSING UNIT

## 2025-02-26 PROCEDURE — 2500000002 HC RX 250 W HCPCS SELF ADMINISTERED DRUGS (ALT 637 FOR MEDICARE OP, ALT 636 FOR OP/ED): Performed by: ORTHOPAEDIC SURGERY

## 2025-02-26 PROCEDURE — 94640 AIRWAY INHALATION TREATMENT: CPT

## 2025-02-26 PROCEDURE — 85027 COMPLETE CBC AUTOMATED: CPT | Performed by: ORTHOPAEDIC SURGERY

## 2025-02-26 PROCEDURE — 97165 OT EVAL LOW COMPLEX 30 MIN: CPT | Mod: GO

## 2025-02-26 PROCEDURE — 2500000004 HC RX 250 GENERAL PHARMACY W/ HCPCS (ALT 636 FOR OP/ED): Performed by: ORTHOPAEDIC SURGERY

## 2025-02-26 PROCEDURE — RXMED WILLOW AMBULATORY MEDICATION CHARGE

## 2025-02-26 PROCEDURE — 97530 THERAPEUTIC ACTIVITIES: CPT | Mod: GP

## 2025-02-26 PROCEDURE — 94664 DEMO&/EVAL PT USE INHALER: CPT | Mod: 59

## 2025-02-26 PROCEDURE — 2500000001 HC RX 250 WO HCPCS SELF ADMINISTERED DRUGS (ALT 637 FOR MEDICARE OP): Performed by: ORTHOPAEDIC SURGERY

## 2025-02-26 PROCEDURE — 97535 SELF CARE MNGMENT TRAINING: CPT | Mod: GO

## 2025-02-26 PROCEDURE — 97161 PT EVAL LOW COMPLEX 20 MIN: CPT | Mod: GP

## 2025-02-26 PROCEDURE — 9420000001 HC RT PATIENT EDUCATION 5 MIN

## 2025-02-26 PROCEDURE — 94762 N-INVAS EAR/PLS OXIMTRY CONT: CPT

## 2025-02-26 RX ORDER — ASPIRIN 81 MG/1
81 TABLET ORAL 2 TIMES DAILY
Qty: 56 TABLET | Refills: 0 | Status: SHIPPED | OUTPATIENT
Start: 2025-02-26 | End: 2025-03-26

## 2025-02-26 RX ORDER — OXYCODONE HYDROCHLORIDE 5 MG/1
5 TABLET ORAL EVERY 4 HOURS PRN
Qty: 20 TABLET | Refills: 0 | Status: SHIPPED | OUTPATIENT
Start: 2025-02-26 | End: 2025-03-05

## 2025-02-26 RX ORDER — ACETAMINOPHEN 500 MG
1000 TABLET ORAL EVERY 6 HOURS PRN
Qty: 120 TABLET | Refills: 0 | Status: SHIPPED | OUTPATIENT
Start: 2025-02-26 | End: 2025-03-13

## 2025-02-26 RX ORDER — IBUPROFEN 600 MG/1
600 TABLET ORAL EVERY 6 HOURS PRN
Qty: 20 TABLET | Refills: 0 | Status: SHIPPED | OUTPATIENT
Start: 2025-02-26 | End: 2025-03-03

## 2025-02-26 RX ORDER — DOCUSATE SODIUM 100 MG/1
100 CAPSULE, LIQUID FILLED ORAL 2 TIMES DAILY
Qty: 30 CAPSULE | Refills: 0 | Status: SHIPPED | OUTPATIENT
Start: 2025-02-26 | End: 2025-03-13

## 2025-02-26 RX ADMIN — DOCUSATE SODIUM 100 MG: 100 CAPSULE, LIQUID FILLED ORAL at 09:20

## 2025-02-26 RX ADMIN — FAMOTIDINE 20 MG: 20 TABLET, FILM COATED ORAL at 09:20

## 2025-02-26 RX ADMIN — CEFAZOLIN SODIUM 2 G: 2 INJECTION, SOLUTION INTRAVENOUS at 04:11

## 2025-02-26 RX ADMIN — POLYETHYLENE GLYCOL 3350 17 G: 17 POWDER, FOR SOLUTION ORAL at 09:19

## 2025-02-26 RX ADMIN — THERA TABS 1 TABLET: TAB at 09:20

## 2025-02-26 RX ADMIN — KETOROLAC TROMETHAMINE 15 MG: 30 INJECTION, SOLUTION INTRAMUSCULAR at 09:20

## 2025-02-26 RX ADMIN — ESCITALOPRAM OXALATE 20 MG: 20 TABLET, FILM COATED ORAL at 09:20

## 2025-02-26 RX ADMIN — ACETAMINOPHEN 1000 MG: 500 TABLET, FILM COATED ORAL at 00:26

## 2025-02-26 RX ADMIN — OXYCODONE HYDROCHLORIDE 10 MG: 5 TABLET ORAL at 04:12

## 2025-02-26 RX ADMIN — FLUTICASONE FUROATE AND VILANTEROL TRIFENATATE 1 PUFF: 200; 25 POWDER RESPIRATORY (INHALATION) at 07:54

## 2025-02-26 RX ADMIN — LEVOTHYROXINE SODIUM 75 MCG: 0.07 TABLET ORAL at 06:22

## 2025-02-26 RX ADMIN — FOLIC ACID TAB 400 MCG 0.4 MG: 400 TAB at 09:20

## 2025-02-26 RX ADMIN — ASPIRIN 81 MG: 81 TABLET, COATED ORAL at 09:20

## 2025-02-26 RX ADMIN — OXYCODONE HYDROCHLORIDE 10 MG: 5 TABLET ORAL at 09:19

## 2025-02-26 RX ADMIN — ACETAMINOPHEN 1000 MG: 500 TABLET, FILM COATED ORAL at 06:22

## 2025-02-26 ASSESSMENT — COGNITIVE AND FUNCTIONAL STATUS - GENERAL
MOVING FROM LYING ON BACK TO SITTING ON SIDE OF FLAT BED WITH BEDRAILS: A LITTLE
MOBILITY SCORE: 19
PERSONAL GROOMING: A LITTLE
MOVING TO AND FROM BED TO CHAIR: A LITTLE
DRESSING REGULAR LOWER BODY CLOTHING: A LITTLE
WALKING IN HOSPITAL ROOM: A LITTLE
STANDING UP FROM CHAIR USING ARMS: A LITTLE
CLIMB 3 TO 5 STEPS WITH RAILING: A LITTLE
TOILETING: A LITTLE
HELP NEEDED FOR BATHING: A LITTLE
EATING MEALS: A LITTLE
DRESSING REGULAR LOWER BODY CLOTHING: A LITTLE
DRESSING REGULAR UPPER BODY CLOTHING: A LITTLE
MOVING TO AND FROM BED TO CHAIR: A LITTLE
DAILY ACTIVITIY SCORE: 18
HELP NEEDED FOR BATHING: A LITTLE
CLIMB 3 TO 5 STEPS WITH RAILING: A LOT
WALKING IN HOSPITAL ROOM: A LITTLE
TURNING FROM BACK TO SIDE WHILE IN FLAT BAD: A LITTLE
MOBILITY SCORE: 19
DAILY ACTIVITIY SCORE: 21
TOILETING: A LITTLE

## 2025-02-26 ASSESSMENT — PAIN SCALES - GENERAL
PAINLEVEL_OUTOF10: 4
PAINLEVEL_OUTOF10: 2
PAINLEVEL_OUTOF10: 4
PAINLEVEL_OUTOF10: 4
PAINLEVEL_OUTOF10: 2
PAINLEVEL_OUTOF10: 0 - NO PAIN
PAINLEVEL_OUTOF10: 7
PAINLEVEL_OUTOF10: 8

## 2025-02-26 ASSESSMENT — ACTIVITIES OF DAILY LIVING (ADL)
ADL_ASSISTANCE: INDEPENDENT
HOME_MANAGEMENT_TIME_ENTRY: 24
BATHING_ASSISTANCE: STAND BY
ADL_ASSISTANCE: INDEPENDENT

## 2025-02-26 ASSESSMENT — PAIN DESCRIPTION - ORIENTATION
ORIENTATION: LEFT
ORIENTATION: LEFT

## 2025-02-26 ASSESSMENT — PAIN - FUNCTIONAL ASSESSMENT
PAIN_FUNCTIONAL_ASSESSMENT: 0-10

## 2025-02-26 ASSESSMENT — PAIN DESCRIPTION - LOCATION
LOCATION: HIP
LOCATION: HIP

## 2025-02-26 NOTE — CARE PLAN
Problem: Pain - Adult  Goal: Verbalizes/displays adequate comfort level or baseline comfort level  Outcome: Progressing     Problem: Safety - Adult  Goal: Free from fall injury  Outcome: Progressing     Problem: Chronic Conditions and Co-morbidities  Goal: Patient's chronic conditions and co-morbidity symptoms are monitored and maintained or improved  Outcome: Progressing     Problem: Nutrition  Goal: Nutrient intake appropriate for maintaining nutritional needs  Outcome: Progressing     Problem: Discharge Planning  Goal: Discharge to home or other facility with appropriate resources  Outcome: Progressing   The patient's goals for the shift include pain management    The clinical goals for the shift include Maintain pt safety/comfort, remain free from falls/injury; monitor labs/vitals; pain management and post-op care

## 2025-02-26 NOTE — PROGRESS NOTES
"Occupational Therapy    Evaluation & Treatment    Patient Name: Sonal Tyson \"Audi\"  MRN: [id number]  Department: [address]  Room: [address] [place], [place] [address]  Today's Date: 2/26/2025  Time Calculation  Start Time: 0815  Stop Time: 0854  Time Calculation (min): 39 min    Assessment  IP OT Assessment  OT Assessment: 58 y/o female s/p Lt HEATHER with Dr. Cruz on 2/25/25. On eval she requires slightly increased assist for xfers, mobility, and self care d/t decreased strength, balance, and knowledge of post op precautions. Skilled OT services are required to maximize safety/IND prior to DC.  Prognosis: Excellent  Barriers to Discharge Home: No anticipated barriers  Evaluation/Treatment Tolerance: Patient tolerated treatment well  Medical Staff Made Aware: Yes  End of Session Communication: Bedside nurse  End of Session Patient Position: Up in chair, Alarm on  Plan:  Treatment Interventions: ADL retraining, Functional transfer training, UE strengthening/ROM, Endurance training, Patient/family training, Neuromuscular reeducation, Equipment evaluation/education, Compensatory technique education  OT Frequency: 4 times per week  OT Discharge Recommendations: Low intensity level of continued care  Equipment Recommended upon Discharge: Wheeled walker  OT Recommended Transfer Status: Assist of 1  OT - OK to Discharge: Yes    Subjective   Current Problem:  1. Primary osteoarthritis of left hip  Walker rolling    aspirin 81 mg EC tablet    docusate sodium (Colace) 100 mg capsule    acetaminophen (Tylenol) 500 mg tablet    ibuprofen 600 mg tablet    oxyCODONE (Roxicodone) 5 mg immediate release tablet    Referral to Home Health        General:  General  Reason for Referral: Decreased ADLS s/p LT HEATHER  Referred By: Dr. Cruz  Past Medical History Relevant to Rehab: anxiety, arthritis, asthma, chronic pain, depression, GERD, hypothyroidism  Family/Caregiver Present: Yes  Caregiver Feedback: spouse present, supportive  Prior to Session Communication: " "Bedside nurse  Patient Position Received: Bed, 3 rail up, Alarm on  Preferred Learning Style: verbal, visual  General Comment: Cleared by nsg, pt met in supine, agreeable to OT assessment    Precautions:  Hearing/Visual Limitations: vision and hearing WFL  LE Weight Bearing Status: Weight Bearing as Tolerated  Medical Precautions: Fall precautions  Post-Surgical Precautions: Left hip precautions  Precautions Comment: posterior hip precautions, LLE WBAT    Pain:  Pain Assessment  Pain Assessment: 0-10  0-10 (Numeric) Pain Score: 4  Pain Type: Acute pain, Surgical pain  Pain Location: Hip  Pain Orientation: Left    Objective   Cognition:  Overall Cognitive Status: Within Functional Limits  Orientation Level: Oriented X4  Cognition Comments: pleasant/cooperative. receptive to instruction    Home Living:  Type of Home: House  Lives With: Spouse  Home Adaptive Equipment: Walker rolling or standard  Home Layout: One level  Home Access: Stairs to enter without rails  Entrance Stairs-Rails: None (reports \"there is a post I can hold onto\")  Entrance Stairs-Number of Steps: 2 HERMILA  Bathroom Shower/Tub: Tub/shower unit, Walk-in shower  Bathroom Toilet: Standard  Bathroom Equipment: Grab bars in shower  Bathroom Accessibility: main level     Prior Function:  Level of [place]: Independent with ADLs and functional transfers, Independent with homemaking with ambulation  Receives Help From: Family  ADL Assistance: Independent  Homemaking Assistance: Independent  Ambulatory Assistance: Independent  Vocational: Full time employment  Prior Function Comments: denies falls. driving pre op.    ADL:  Eating Assistance: Stand by  Eating Deficit: Setup  Grooming Assistance: Stand by  Grooming Deficit: Setup (items placed within reach while seated supported)  Bathing Assistance: Stand by  Bathing Deficit: Supervision/safety  UE Dressing Assistance: Stand by  UE Dressing Deficit: Setup (to alireza pull over shirt and bra while seated)  LE " Dressing Assistance: Stand by  LE Dressing Deficit: Supervision/safety (cued on technique, sequencing, use of ADL kit)  Toileting Assistance with Device: Stand by  Toileting Deficit: Supervison/safety (cues on body mechanics and adherence to precautions)  ADL Comments: OT provides extensive instruction re: compensatory techniques, use of AE/DME, and safety techniques during self care completion in order to ensure adherence to post op precautions and increased IND during self care.  Patient demos good teachback during instruction re: ADL kit use and proper sequencing.  Pt and spouse verbalize understanding of all instruction provided this date. Educated on how to obtain additional DME such as TTB if needed at DC.    Activity Tolerance:  Endurance: Endurance does not limit participation in activity  Activity Tolerance Comments: good    Bed Mobility/Transfers:   Bed Mobility  Bed Mobility: Yes  Bed Mobility 1  Bed Mobility 1: Supine to sitting  Level of Assistance 1: Close supervision  Bed Mobility Comments 1: HOB elevated to enhance ease of transition. cues on technique and LLE positioning    Transfers  Transfer: Yes  Transfer 1  Technique 1: Stand to sit, Sit to stand  Transfer Device 1: Walker  Transfer Level of Assistance 1: Close supervision  Trials/Comments 1: to/from EOB, toilet, bedside chair with intermittent cues on hand placement, body positioning, LE positioning.  Good carryover of instruction onted  Transfers 2  Trials/Comments 2: OT provides verbal instruction and visual demonstration on proper technique for both WIS and tub xfer at home.  Patient verbalizes understanding of both techniques, understands how to obtain DME such as TTB if needed at DC.  Transfers 3  Trials/Comments 3: OT provides verbal instruction, visual demo, and handout to ensure proper technique for xfer in/out of car.  Patient and spouse verbalize understanding of instruction provided.    Functional Mobility:  Functional  Mobility  Functional Mobility Performed: Yes  Functional Mobility 1  Surface 1: Level tile  Device 1: Rolling walker  Assistance 1: Close supervision  Comments 1: short household distance to/from restroom within hospital room with FWW, cues on step sequencing, walker mgmt, maintaining proper proximity to device with good carryover.    Vision: Vision - Basic Assessment  Current Vision: Does not wear glasses  Sensation:  Light Touch: No apparent deficits  Strength:  Strength Comments: BUEs WFL; observed functionally  Coordination:  Movements are Fluid and Coordinated: No  Upper Body Coordination: WFL  Lower Body Coordination: LLE slightly impaired d/t strength deficit and post op pain   Hand Function:  Hand Function  Gross Grasp: Functional  Coordination: Functional  Extremities: RUE   RUE : Within Functional Limits and LUE   LUE: Within Functional Limits    Outcome Measures: [facility] Daily Activity  Putting on and taking off regular lower body clothing: A little  Bathing (including washing, rinsing, drying): A little  Putting on and taking off regular upper body clothing: A little  Toileting, which includes using toilet, bedpan or urinal: A little  Taking care of personal grooming such as brushing teeth: A little  Eating Meals: A little  Daily Activity - Total Score: 18      Education Documentation  Handouts, taught by Alo Bejarano OT at 2/26/2025 10:25 AM.  Learner: Patient  Readiness: Acceptance  Method: Explanation  Response: Verbalizes Understanding, Needs Reinforcement  Comment: OT POC, post op precautions, compensatory techniques, ADL retraining    Body Mechanics, taught by Alo Bejarano OT at 2/26/2025 10:25 AM.  Learner: Patient  Readiness: Acceptance  Method: Explanation  Response: Verbalizes Understanding, Needs Reinforcement  Comment: OT POC, post op precautions, compensatory techniques, ADL retraining    Precautions, taught by Alo Bejarano OT at 2/26/2025 10:25 AM.  Learner: Patient  Readiness:  Acceptance  Method: Explanation  Response: Verbalizes Understanding, Needs Reinforcement  Comment: OT POC, post op precautions, compensatory techniques, ADL retraining    ADL Training, taught by Alo Bejarano OT at 2/26/2025 10:25 AM.  Learner: Patient  Readiness: Acceptance  Method: Explanation  Response: Verbalizes Understanding, Needs Reinforcement  Comment: OT POC, post op precautions, compensatory techniques, ADL retraining    Education Comments  No comments found.      Goals:   Encounter Problems       Encounter Problems (Active)       OT Goals       ADLs (Progressing)       Start:  02/26/25    Expected End:  03/12/25       Patient will complete ADL tasks, with modified independence using AE as needed in order to increase patient's safety and independence with self-care tasks.           Functional Transfers (Progressing)       Start:  02/26/25    Expected End:  03/12/25       Patient will complete functional transfers with modified independence using least restrictive device, in order to increase patient's safety and independence with daily tasks.           Precautions (Progressing)       Start:  02/26/25    Expected End:  03/12/25       Patient will independently verbalize and adhere to posterior hip precautions in preparation for ADLS.

## 2025-02-26 NOTE — CONSULTS
Hospitalist Inpatient consult to Medicine  Consult performed by: Lilly Kerr, APRN-CNP  Consult ordered by: Umer Cruz MD          Reason For Consult  Asthma, GERD, depression, hypothyroidism    History Of Present Illness  Audi Tyson is a 59 y.o. female presenting with left hip pain.  Patient is a 59-year-old female patient with past medical history of asthma, GERD, depression, hypothyroidism who presented at [facility] in [place] for scheduled left total hip replacement.  Patient is alert oriented to x 3, she is laying in bed, denies any chest pain or shortness of breath, denies any nausea vomiting or abdominal pain, denies any palpitation, denies any dizziness or lightheadedness.  Patient tolerated dinner.  Hospitalist service was consulted for the management of patient's chronic illnesses such as asthma, GERD, depression and hypothyroidism.  Left hip status post total hip replacement dressing is dry and intact, patient with positive palpable pulses.  Past Medical History  She has a past medical history of Adverse effect of anesthesia, Allergic, Anxiety, Arthritis, Asthma, Chronic pain disorder, Depression, Disease of thyroid gland, Easy bruising, GERD (gastroesophageal reflux disease), Hypothyroid, Joint pain, and Varicella.    Surgical History  She has a past surgical history that includes  section, low transverse; Cholecystectomy; Joint replacement; Other surgical history; and LASIK.     Social History  She reports that she has been smoking cigarettes. She started smoking about 48 years ago. She has a 48.2 pack-year smoking history. She has never used smokeless tobacco. She reports that she does not currently use alcohol. She reports current drug use. Drug: Marijuana.    Family History  Family History   Problem Relation Name Age of Onset    Melanoma Mother Mom     Heart disease Mother Mom     Other (Heart Stent) Mother Mom     Arthritis Mother Mom     Other (Triple Bypass) Brother  38     Melanoma Maternal Grandfather      Colon cancer Paternal Grandmother Mama         Allergies  House dust and Penicillin    Review of Systems  Constitutional:  Positive for activity change.   HENT: Negative.     Eyes: Negative.    Respiratory: Negative.          Smoker  asthma   Cardiovascular: Negative.    Gastrointestinal: Negative.    Endocrine: Negative.    Genitourinary: Negative.    Musculoskeletal:  Positive for arthralgias and gait problem.        Chronic left hip pain   Skin: Negative.    Psychiatric/Behavioral: Negative.      Physical Exam  Vitals reviewed.   HENT:      Head: Normocephalic and atraumatic.      Mouth/Throat:      Mouth: Mucous membranes are moist.   Eyes:      Pupils: Pupils are equal, round, and reactive to light.   Cardiovascular:      Rate and Rhythm: Normal rate and regular rhythm.   Pulmonary:      Effort: Pulmonary effort is normal.      Breath sounds: Bilateral lower lobe crackles noted  Abdominal:      Palpations: Abdomen is soft.   Musculoskeletal:      Cervical back: Normal range of motion.      Comments: Status post left hip replacement, dressing dry and intact, positive palpable pulses  Skin:     General: Skin is warm and dry.   Neurological:      Mental Status: She is alert and oriented to person, place, and time.   Psychiatric:         Mood and Affect: Mood ted       Last Recorded Vitals  /62 (BP Location: Right arm, Patient Position: Lying)   Pulse 76   Temp 36.6 °C (97.9 °F) (Temporal)   Resp 16   Wt 68 kg (149 lb 14.6 oz)   SpO2 95%     Relevant Results  XR pelvis 1-2 views    Result Date: 2/25/2025  Interpreted By:  Jackie Lopez, STUDY: Single view pelvis.   INDICATION: Signs/Symptoms:Post op hip.   COMPARISON: None.   ACCESSION NUMBER(S): [id number]   ORDERING CLINICIAN: GRACIE DOMINGUEZ   FINDINGS: No acute fracture or malalignment. Immediate postsurgical changes of left total hip arthroplasty. Hardware is intact without perihardware fractures or lucencies.  Expected postsurgical soft tissue gas within the surrounding soft tissues.       1. Immediate postsurgical changes of left total hip arthroplasty without hardware complication.   MACRO: None.   Signed by: Jackie Lopez 2/25/2025 1:50 PM Dictation workstation:   [id number]    ECG 12 Lead    Result Date: 2/14/2025  Normal sinus rhythm Nonspecific ST abnormality Abnormal ECG No previous ECGs available Confirmed by Riaz Grace ([id number]) on 2/14/2025 8:42:53 PM     No results found for this or any previous visit (from the past 24 hours).   Scheduled medications  acetaminophen, 1,000 mg, oral, q6h  aspirin, 81 mg, oral, BID  ceFAZolin, 2 g, intravenous, q8h  docusate sodium, 100 mg, oral, BID  [START ON 2/26/2025] escitalopram, 20 mg, oral, Daily  [START ON 2/26/2025] famotidine, 20 mg, oral, Daily  [START ON 2/26/2025] fluticasone furoate-vilanteroL, 1 puff, inhalation, Daily  [START ON 2/26/2025] folic acid, 0.4 mg, oral, Daily  [START ON 2/26/2025] ketorolac, 15 mg, intravenous, Once  [START ON 2/26/2025] levothyroxine, 75 mcg, oral, Daily before breakfast  multivitamin, 1 tablet, oral, Daily  nicotine, 1 patch, transdermal, Daily  polyethylene glycol, 17 g, oral, Daily      Continuous medications  lactated Ringer's, 125 mL/hr, Last Rate: 125 mL/hr (02/25/25 1947)  oxygen, 2 L/min, Last Rate: Stopped (02/25/25 2004)      PRN medications  PRN medications: ketorolac, morphine, ondansetron, oxyCODONE, oxyCODONE   Assessment/Plan     Left total hip replacement  -Managed by orthopedic surgery  -Supportive care, pain management, PT/OT, fall precaution  -Encourage IS use    Asthma  -Patient is on room air, denies any shortness of breath  -She is a smoker-> requested nicotine patch  -Resumed home medications    Depression/anxiety  -Resume home medication    GERD  -Resumed home medication    Hypothyroidism  -Resume home medication    DVT risk  Per surgery    Discharge plan  Anticipated discharge to home with home care in  24-48 hours    Thank you for allowing us be part of this patient's care, we will follow along.    Lilly Kerr, APRN-CNP

## 2025-02-26 NOTE — CARE PLAN
The patient's goals for the shift include pain management    The clinical goals for the shift include Maintain pt safety/comfort, remain free from falls/injury; monitor labs/vitals; pain management and post-op care      Problem: Pain - Adult  Goal: Verbalizes/displays adequate comfort level or baseline comfort level  2/26/2025 0749 by Jennifer Hunt RN  Outcome: Progressing  2/25/2025 1832 by Jennifer Hunt RN  Outcome: Progressing     Problem: Fall/Injury  Goal: Not fall by end of shift  2/26/2025 0749 by Jennifer Hunt RN  Outcome: Progressing  2/25/2025 1832 by Jennifer Hunt RN  Outcome: Progressing  Goal: Be free from injury by end of the shift  2/26/2025 0749 by Jennifer Hunt RN  Outcome: Progressing  2/25/2025 1832 by Jennifer Hunt RN  Outcome: Progressing  Goal: Verbalize understanding of personal risk factors for fall in the hospital  2/26/2025 0749 by Jennifer Hunt RN  Outcome: Progressing  2/25/2025 1832 by Jennifer Hunt RN  Outcome: Progressing  Goal: Verbalize understanding of risk factor reduction measures to prevent injury from fall in the home  2/26/2025 0749 by Jennifer Hunt RN  Outcome: Progressing  2/25/2025 1832 by Jennifer Hunt RN  Outcome: Progressing  Goal: Use assistive devices by end of the shift  2/26/2025 0749 by Jennifer Hunt RN  Outcome: Progressing  2/25/2025 1832 by Jennifer Hunt RN  Outcome: Progressing  Goal: Pace activities to prevent fatigue by end of the shift  2/26/2025 0749 by Jennifer Hunt RN  Outcome: Progressing  2/25/2025 1832 by Jennifer Hunt RN  Outcome: Progressing     Problem: Pain  Goal: Takes deep breaths with improved pain control throughout the shift  2/26/2025 0749 by Jennifer Hunt RN  Outcome: Progressing  2/25/2025 1832 by Jennifer Hunt RN  Outcome: Progressing  Goal: Turns in bed with improved pain control throughout the shift  2/26/2025 0749 by Jennifer Hunt RN  Outcome: Progressing  2/25/2025 1832 by Jennifer Hunt RN  Outcome: Progressing  Goal:  Walks with improved pain control throughout the shift  2/26/2025 0749 by Jennifer Hunt RN  Outcome: Progressing  2/25/2025 1832 by Jennifer Hunt RN  Outcome: Progressing  Goal: Performs ADL's with improved pain control throughout shift  2/26/2025 0749 by Jennifer Hunt RN  Outcome: Progressing  2/25/2025 1832 by Jennifer Hunt RN  Outcome: Progressing  Goal: Participates in PT with improved pain control throughout the shift  2/26/2025 0749 by Jennifer Hunt RN  Outcome: Progressing  2/25/2025 1832 by Jennifer Hunt RN  Outcome: Progressing  Goal: Free from opioid side effects throughout the shift  2/26/2025 0749 by Jennifer Hunt RN  Outcome: Progressing  2/25/2025 1832 by Jennifer Hunt RN  Outcome: Progressing  Goal: Free from acute confusion related to pain meds throughout the shift  2/26/2025 0749 by Jennifer Hunt RN  Outcome: Progressing  2/25/2025 1832 by Jennifer Hunt RN  Outcome: Progressing

## 2025-02-26 NOTE — HH CARE COORDINATION
Home Care received a Referral for Physical Therapy. We have processed the referral for a Start of Care on 2/27/25.     If you have any questions or concerns, please feel free to contact us at [phone]. Follow the prompts, enter your five digit zip code, and you will be directed to your care team on EAST 1.

## 2025-02-26 NOTE — PROGRESS NOTES
02/26/25 1057   Discharge Planning   Living Arrangements Spouse/significant other   Support Systems Spouse/significant other   Assistance Needed independent   Type of Residence Private residence   Number of Stairs to Enter Residence 2   Number of Stairs Within Residence 0   Do you have animals or pets at home? Yes   Type of Animals or Pets 1 dog   Who is requesting discharge planning? Patient   Home or Post Acute Services In home services   Type of Home Care Services Home PT   Expected Discharge Disposition Home H  ([facility])   Does the patient need discharge transport arranged? No     Home with [facility].    SOC:  2/27

## 2025-02-26 NOTE — PROGRESS NOTES
Physical Therapy    Physical Therapy Evaluation & Treatment    Patient Name: Audi Tyson  MRN: [id number]  Department:   Room: [address] [place] [address]  Today's Date: 2/26/2025   Time Calculation  Start Time: 1142  Stop Time: 1215  Time Calculation (min): 33 min    Assessment/Plan   PT Assessment  PT Assessment Results: Decreased strength, Decreased mobility, Decreased skin integrity, Orthopedic restrictions, Pain  Rehab Prognosis: Excellent  Barriers to Discharge Home: No anticipated barriers  Evaluation/Treatment Tolerance: Patient tolerated treatment well  Medical Staff Made Aware: Yes  Strengths: Ability to acquire knowledge, Attitude of self, Premorbid level of function, Support of Caregivers  Barriers to Participation: Comorbidities  End of Session Communication: Bedside nurse  Assessment Comment: Pt moving very well post op, moderate to high pain level. Pt would benefit from continued PT services (HH or OP)  to progress post op mobility after discharge.  End of Session Patient Position: Up in chair, Alarm on   IP OR SWING BED PT PLAN  Inpatient or Swing Bed: Inpatient  PT Plan  Treatment/Interventions:  (HEP-issued)  PT Plan: PT Eval only  PT Eval Only Reason: Safe to return home  PT Frequency: PT eval only  PT Discharge Recommendations: No further acute PT  Equipment Recommended upon Discharge: Wheeled walker  PT Recommended Transfer Status: Stand by assist, Assistive device  PT - OK to Discharge: Yes      Subjective     General Visit Information:  General  Reason for Referral: recent surgery  Referred By: Dr. Cruz  Past Medical History Relevant to Rehab: anxiety, arthritis, asthma, chronic pain, depression, GERD, hypothyroidism  Family/Caregiver Present: Yes  Caregiver Feedback: spouse present, supportive  Prior to Session Communication: Bedside nurse  Patient Position Received: Bed, 3 rail up, Alarm on  Preferred Learning Style: verbal, visual  General Comment: Pt is a 59 y.o. female adm for elective3 Lt HEATHER on 2/25/25  w/ Dr Cruz. Pt agreeable to PT shaquille.  Home Living:  Home Living  Type of Home: House  Lives With: Spouse  Home Adaptive Equipment: Walker rolling or standard, Other (Comment) (adjustable bed)  Home Layout: One level  Home Access: Stairs to enter without rails  Entrance Stairs-Rails: None (has a post)  Entrance Stairs-Number of Steps: 2  Bathroom Shower/Tub: Tub/shower unit, Walk-in shower  Bathroom Toilet: Standard  Bathroom Equipment: Grab bars in shower  Bathroom Accessibility: main level  Prior Level of Function:  Prior Function Per Pt/Caregiver Report  Level of [place]: Independent with ADLs and functional transfers, Independent with homemaking with ambulation  Receives Help From: Family  ADL Assistance: Independent  Homemaking Assistance: Independent  Ambulatory Assistance: Independent  Vocational: Full time employment  Prior Function Comments: denies falls. driving pre op, sleeps in an adjustable bed  Precautions:  Precautions  LE Weight Bearing Status: Weight Bearing as Tolerated  Medical Precautions: Fall precautions  Post-Surgical Precautions: Left hip precautions  Precautions Comment: reviewed posterior hip precautions w/ pt, handout issued for reference.     Objective   Pain:  Pain Assessment  Pain Assessment: 0-10  0-10 (Numeric) Pain Score: 4 (up to 8\10 w/ activity, pt had meds already)  Pain Type: Surgical pain  Pain Location: Hip  Pain Orientation: Left  Pain Interventions: Ambulation/increased activity  Response to Interventions: Increase in pain  Cognition:  Cognition  Overall Cognitive Status: Within Functional Limits  Orientation Level: Oriented X4  Cognition Comments: pleasant and cooperative, good insight  Insight: Within function limits    General Assessments:    Activity Tolerance  Endurance: Endurance does not limit participation in activity  Activity Tolerance Comments: good    Sensation  Sensation Comment: denies abn sensation    Strength  Strength Comments: educated pt on and issued  handout for initial post o p HEP w/ handout and light green resistive band issued. Pt able to demonstrate hip abd w/ band w/o difficulty x 3 reps.  Coordination  Coordination Comment: good    Dynamic Standing Balance  Dynamic Standing-Balance Support: Bilateral upper extremity supported  Dynamic Standing-Level of Assistance: Distant supervision  Dynamic Standing-Balance: Turning (amb)  Dynamic Standing-Comments: slow, steady, good control of walker, no LOB  Functional Assessments:  Bed Mobility  Bed Mobility: Yes  Bed Mobility 1  Bed Mobility 1: Supine to sitting  Level of Assistance 1: Distant supervision  Bed Mobility Comments 1: to Rt EOB, HOB elevated part way    Transfers  Transfer: Yes  Transfer 1  Technique 1: Sit to stand, Stand to sit  Transfer Device 1: Walker  Transfer Level of Assistance 1: Distant supervision  Trials/Comments 1: from EOB to walker, to/from chair w/ arms x 2; good technique    Ambulation/Gait Training  Ambulation/Gait Training Performed: Yes  Ambulation/Gait Training 1  Surface 1: Level tile  Device 1: Rolling walker  Assistance 1: Distant supervision  Comments/Distance (ft) 1: Pt amb ~ 125' x 2, fair pace, step through, continuous pattern, steady, even step length and stance time.    Stairs  Stairs: Yes  Stairs  Rails 1: Left  Device 1: Railing  Assistance 1: Minimum assistance  Comment/Number of Steps 1: Pt amb up/down 4 steps w/ spouse's arm assist, proper sequence, steady.  Extremity/Trunk Assessments:  RLE   RLE : Within Functional Limits  LLE   LLE :  (w/in HEATHER precautions, knee, ankle functional, 3/5 strength or >)  Treatments:  Ambulation/Gait Training  Ambulation/Gait Training Performed: Yes  Ambulation/Gait Training 1  Surface 1: Level tile  Device 1: Rolling walker  Assistance 1: Distant supervision  Comments/Distance (ft) 1: Pt amb ~ 125' x 2, fair pace, step through, continuous pattern, steady, even step length and stance time.  Transfers  Transfer: Yes  Transfer  1  Technique 1: Sit to stand, Stand to sit  Transfer Device 1: Walker  Transfer Level of Assistance 1: Distant supervision  Trials/Comments 1: from EOB to walker, to/from chair w/ arms x 2; good technique    Stairs  Stairs: Yes  Stairs  Rails 1: Left  Device 1: Railing  Assistance 1: Minimum assistance  Comment/Number of Steps 1: Pt amb up/down 4 steps w/ spouse's arm assist, proper sequence, steady.  Outcome Measures:  [facility] Basic Mobility  Turning from your back to your side while in a flat bed without using bedrails: A little  Moving from lying on your back to sitting on the side of a flat bed without using bedrails: A little  Moving to and from bed to chair (including a wheelchair): A little  Standing up from a chair using your arms (e.g. wheelchair or bedside chair): None  To walk in hospital room: A little  Climbing 3-5 steps with railing: A little  Basic Mobility - Total Score: 19    Encounter Problems       Encounter Problems (Active)       Pain - Adult              Education Documentation  Handouts, taught by Lay España PT at 2/26/2025 12:34 PM.  Learner: Patient  Readiness: Acceptance  Method: Explanation, Demonstration, Handout  Response: Verbalizes Understanding, Demonstrated Understanding  Comment: handouts issued for HEP and post op HEATHER precautions    Precautions, taught by Lay España PT at 2/26/2025 12:34 PM.  Learner: Patient  Readiness: Acceptance  Method: Explanation, Demonstration, Handout  Response: Verbalizes Understanding, Demonstrated Understanding  Comment: handouts issued for HEP and post op HEATHER precautions    Home Exercise Program, taught by Lay España PT at 2/26/2025 12:34 PM.  Learner: Patient  Readiness: Acceptance  Method: Explanation, Demonstration, Handout  Response: Verbalizes Understanding, Demonstrated Understanding  Comment: handouts issued for HEP and post op HEATHER precautions    Mobility Training, taught by Lay España PT at 2/26/2025 12:34 PM.  Learner:  Patient  Readiness: Acceptance  Method: Explanation, Demonstration, Handout  Response: Verbalizes Understanding, Demonstrated Understanding  Comment: handouts issued for HEP and post op HEATHER precautions    Education Comments  No comments found.

## 2025-02-26 NOTE — DISCHARGE SUMMARY
Discharge Diagnosis  Primary osteoarthritis of left hip    Issues Requiring Follow-Up  Left total hip replacement    Test Results Pending At Discharge  Pending Labs       No current pending labs.            Hospital Course   Patient brought to the operating room day of admission left total hip replacement uncomplicated surgery admitted to regular nursing floor plan for home with home health care    Pertinent Physical Exam At Time of Discharge  Physical Exam    Home Medications     Medication List      START taking these medications     acetaminophen 500 mg tablet; Commonly known as: Tylenol; Take 2 tablets   (1,000 mg) by mouth every 6 hours if needed for mild pain (1 - 3) for up   to 15 days.   aspirin 81 mg EC tablet; Take 1 tablet (81 mg) by mouth 2 times a day   for 28 days.   docusate sodium 100 mg capsule; Commonly known as: Colace; Take 1   capsule (100 mg) by mouth 2 times a day for 15 days.   oxyCODONE 5 mg immediate release tablet; Commonly known as: Roxicodone;   Take 1 tablet (5 mg) by mouth every 4 hours if needed for severe pain (7 -   10) for up to 7 days.     CHANGE how you take these medications     * ibuprofen 200 mg tablet; What changed: Another medication with the   same name was added. Make sure you understand how and when to take each.   * ibuprofen 600 mg tablet; Take 1 tablet (600 mg) by mouth every 6 hours   if needed for mild pain (1 - 3) for up to 5 days.; What changed: You were   already taking a medication with the same name, and this prescription was   added. Make sure you understand how and when to take each.  * This list has 2 medication(s) that are the same as other medications   prescribed for you. Read the directions carefully, and ask your doctor or   other care provider to review them with you.     CONTINUE taking these medications     albuterol 90 mcg/actuation inhaler; INHALE 2 PUFFS BY MOUTH AS NEEDED   EVERY FOUR HOURS   B complex-vitamin C tablet   biotin 10,000 mcg  capsule   calcium carbonate 200 mg calcium chewable tablet; Commonly known as:   Tums   escitalopram 20 mg tablet; Commonly known as: Lexapro; take 1 tablet by   mouth once a day   famotidine 20 mg tablet; Commonly known as: Pepcid   ferrous sulfate 325 (65 Fe) mg EC tablet   fluticasone furoate-vilanteroL 200-25 mcg/dose inhaler; Commonly known   as: Breo Ellipta; Inhale 1 puff once daily.   folic acid 400 mcg tablet; Commonly known as: Folvite   levothyroxine 75 mcg tablet; Commonly known as: Synthroid, Levoxyl; take   1 tablet by mouth in the morning on an empty stomach   loratadine 10 mg capsule   magnesium 250 mg tablet   multivitamin tablet   VITAMIN C ORAL   Vitamin D3 50 mcg (2,000 unit) capsule; Generic drug: cholecalciferol   zolpidem 10 mg tablet; Commonly known as: Ambien; Take 1 tablet (10 mg)   by mouth as needed at bedtime for sleep.       Outpatient Follow-Up  Future Appointments   Date Time Provider Department Center   4/28/2025  2:00 PM DERRICK Salinas-CNP [id number] Sylvester Cruz MD

## 2025-02-26 NOTE — PROGRESS NOTES
"Sonal Tyson \"Audi\" is a 59 y.o. female on day 0 of admission presenting with Primary osteoarthritis of left hip.    Subjective   Doing well.  Starting to work with therapy.  Hip pain controlled.  Denies chest pain or shortness of breath.  Tolerating p.o. intake.  Wants to go home today.       Objective     Physical Exam  Alert and oriented x 3  Left lower extremity: Dressing dry.  Compartments are soft.  Leg lengths equal while supine.  Active ankle dorsiflexion plantarflexion intact.  Last Recorded Vitals  Blood pressure 125/58, pulse 62, temperature 36.4 °C (97.5 °F), temperature source Temporal, resp. rate 18, height 1.626 m (5' 4.02\"), weight 68 kg (149 lb 14.6 oz), SpO2 96%.  Intake/Output last 3 Shifts:  I/O last 3 completed shifts:  In: 2870.4 (42.2 mL/kg) [I.V.:1670.4 (24.6 mL/kg); IV Piggyback:1200]  Out: 2275 (33.5 mL/kg) [Urine:2100 (0.9 mL/kg/hr); Blood:175]  Weight: 68 kg     Relevant Results    I reviewed postoperative imaging of the pelvis.  Stable position of total hip replacement Scheduled medications  acetaminophen, 1,000 mg, oral, q6h  aspirin, 81 mg, oral, BID  docusate sodium, 100 mg, oral, BID  escitalopram, 20 mg, oral, Daily  famotidine, 20 mg, oral, Daily  fluticasone furoate-vilanteroL, 1 puff, inhalation, Daily  folic acid, 0.4 mg, oral, Daily  ketorolac, 15 mg, intravenous, Once  levothyroxine, 75 mcg, oral, Daily before breakfast  multivitamin, 1 tablet, oral, Daily  nicotine, 1 patch, transdermal, Daily  polyethylene glycol, 17 g, oral, Daily      Continuous medications  lactated Ringer's, 125 mL/hr, Last Rate: 125 mL/hr (02/26/25 0420)  oxygen, 2 L/min, Last Rate: Stopped (02/25/25 2004)      PRN medications  PRN medications: ketorolac, morphine, ondansetron, oxyCODONE, oxyCODONE  Results for orders placed or performed during the hospital encounter of 02/25/25 (from the past 24 hours)   CBC   Result Value Ref Range    WBC 7.7 4.4 - 11.3 x10*3/uL    nRBC 0.0 0.0 - 0.0 /100 WBCs "    RBC 3.90 (L) 4.00 - 5.20 x10*6/uL    Hemoglobin 12.0 12.0 - 16.0 g/dL    Hematocrit 37.2 36.0 - 46.0 %    MCV 95 80 - 100 fL    MCH 30.8 26.0 - 34.0 pg    MCHC 32.3 32.0 - 36.0 g/dL    RDW 13.2 11.5 - 14.5 %    Platelets 142 (L) 150 - 450 x10*3/uL   Basic metabolic panel   Result Value Ref Range    Glucose 173 (H) 74 - 99 mg/dL    Sodium 137 136 - 145 mmol/L    Potassium 3.3 (L) 3.5 - 5.3 mmol/L    Chloride 104 98 - 107 mmol/L    Bicarbonate 25 21 - 32 mmol/L    Anion Gap 11 10 - 20 mmol/L    Urea Nitrogen 11 6 - 23 mg/dL    Creatinine 0.63 0.50 - 1.05 mg/dL    eGFR >90 >60 mL/min/1.73m*2    Calcium 8.1 (L) 8.6 - 10.3 mg/dL                            Assessment/Plan   Assessment & Plan  Primary osteoarthritis of left hip    left total hip replacement: Postoperative day 1.  Mobilize physical therapy weightbearing as tolerated posterior hip precautions.  Plan for home with home health care at time of discharge    DVT risk: Aspirin twice daily       I spent  minutes in the professional and overall care of this patient.      Umer Cruz MD

## 2025-02-27 ENCOUNTER — HOME CARE VISIT (OUTPATIENT)
Dept: HOME HEALTH SERVICES | Facility: HOME HEALTH | Age: 60
End: 2025-02-27
Payer: COMMERCIAL

## 2025-02-27 VITALS — DIASTOLIC BLOOD PRESSURE: 76 MMHG | SYSTOLIC BLOOD PRESSURE: 140 MMHG | TEMPERATURE: 98.4 F | HEART RATE: 64 BPM

## 2025-02-27 PROCEDURE — G0151 HHCP-SERV OF PT,EA 15 MIN: HCPCS

## 2025-02-27 SDOH — HEALTH STABILITY: PHYSICAL HEALTH: EXERCISE TYPE: LE THER EX

## 2025-02-27 ASSESSMENT — ENCOUNTER SYMPTOMS
LIMITED RANGE OF MOTION: 1
PERSON REPORTING PAIN: PATIENT
HIGHEST PAIN SEVERITY IN PAST 24 HOURS: 8/10
PAIN LOCATION - PAIN SEVERITY: 4/10
PAIN: 1
HYPERTENSION: 1
PAIN LOCATION: LEFT HIP
MUSCLE WEAKNESS: 1

## 2025-02-27 ASSESSMENT — ACTIVITIES OF DAILY LIVING (ADL)
AMBULATION ASSISTANCE: 1
ENTERING_EXITING_HOME: NEEDS ASSISTANCE
PHYSICAL TRANSFERS ASSESSED: 1
CURRENT_FUNCTION: MINIMUM ASSIST
CURRENT_FUNCTION: CONTACT GUARD ASSIST
OASIS_M1830: 03
AMBULATION ASSISTANCE: STAND BY ASSIST
AMBULATION ASSISTANCE ON FLAT SURFACES: 1
CURRENT_FUNCTION: STAND BY ASSIST
AMBULATION_DISTANCE/DURATION_TOLERATED: 150 FT

## 2025-03-04 ENCOUNTER — HOME CARE VISIT (OUTPATIENT)
Dept: HOME HEALTH SERVICES | Facility: HOME HEALTH | Age: 60
End: 2025-03-04
Payer: COMMERCIAL

## 2025-03-04 VITALS
RESPIRATION RATE: 18 BRPM | SYSTOLIC BLOOD PRESSURE: 124 MMHG | DIASTOLIC BLOOD PRESSURE: 78 MMHG | TEMPERATURE: 96.8 F | HEART RATE: 74 BPM | OXYGEN SATURATION: 98 %

## 2025-03-04 PROCEDURE — G0157 HHC PT ASSISTANT EA 15: HCPCS | Mod: CQ

## 2025-03-04 ASSESSMENT — ENCOUNTER SYMPTOMS
PAIN LOCATION - RELIEVING FACTORS: REST, ICE, MEDICATION
PAIN LOCATION - PAIN DURATION: INTERMITTENT
PAIN LOCATION - PAIN FREQUENCY: INTERMITTENT
SUBJECTIVE PAIN PROGRESSION: UNCHANGED
LOWEST PAIN SEVERITY IN PAST 24 HOURS: 1/10
PAIN LOCATION - EXACERBATING FACTORS: ACTIVITY
HIGHEST PAIN SEVERITY IN PAST 24 HOURS: 4/10
PAIN LOCATION - PAIN QUALITY: SHARP
PAIN LOCATION: LEFT HIP
PERSON REPORTING PAIN: PATIENT
PAIN LOCATION - PAIN SEVERITY: 2/10
PAIN SEVERITY GOAL: 0/10
PAIN: 1

## 2025-03-06 ENCOUNTER — HOME CARE VISIT (OUTPATIENT)
Dept: HOME HEALTH SERVICES | Facility: HOME HEALTH | Age: 60
End: 2025-03-06
Payer: COMMERCIAL

## 2025-03-06 VITALS
OXYGEN SATURATION: 98 % | HEART RATE: 78 BPM | TEMPERATURE: 96.9 F | SYSTOLIC BLOOD PRESSURE: 122 MMHG | DIASTOLIC BLOOD PRESSURE: 78 MMHG | RESPIRATION RATE: 18 BRPM

## 2025-03-06 PROCEDURE — G0157 HHC PT ASSISTANT EA 15: HCPCS | Mod: CQ

## 2025-03-06 ASSESSMENT — ENCOUNTER SYMPTOMS
PAIN LOCATION: LEFT HIP
PAIN: 1
PAIN LOCATION - PAIN SEVERITY: 2/10
PAIN LOCATION - PAIN DURATION: INTERMITTENT
LOWEST PAIN SEVERITY IN PAST 24 HOURS: 1/10
PERSON REPORTING PAIN: PATIENT
PAIN LOCATION - PAIN QUALITY: ACHE
HIGHEST PAIN SEVERITY IN PAST 24 HOURS: 5/10
PAIN LOCATION - EXACERBATING FACTORS: ACTIVITY
PAIN LOCATION - PAIN FREQUENCY: INTERMITTENT
SUBJECTIVE PAIN PROGRESSION: UNCHANGED
PAIN SEVERITY GOAL: 0/10
PAIN LOCATION - RELIEVING FACTORS: REST, ICE, MEDICATION

## 2025-03-10 ENCOUNTER — HOME CARE VISIT (OUTPATIENT)
Dept: HOME HEALTH SERVICES | Facility: HOME HEALTH | Age: 60
End: 2025-03-10
Payer: COMMERCIAL

## 2025-03-10 VITALS
OXYGEN SATURATION: 98 % | DIASTOLIC BLOOD PRESSURE: 72 MMHG | RESPIRATION RATE: 18 BRPM | TEMPERATURE: 97.4 F | SYSTOLIC BLOOD PRESSURE: 124 MMHG | HEART RATE: 74 BPM

## 2025-03-10 PROCEDURE — G0157 HHC PT ASSISTANT EA 15: HCPCS | Mod: CQ

## 2025-03-10 ASSESSMENT — ENCOUNTER SYMPTOMS
PERSON REPORTING PAIN: PATIENT
PAIN SEVERITY GOAL: 0/10
PAIN LOCATION - PAIN SEVERITY: 3/10
PAIN LOCATION: LEFT HIP
SUBJECTIVE PAIN PROGRESSION: UNCHANGED
HIGHEST PAIN SEVERITY IN PAST 24 HOURS: 4/10
LOWEST PAIN SEVERITY IN PAST 24 HOURS: 2/10
PAIN LOCATION - PAIN QUALITY: ACHE
PAIN LOCATION - PAIN FREQUENCY: INTERMITTENT
PAIN: 1
PAIN LOCATION - EXACERBATING FACTORS: ACTIVITY
PAIN LOCATION - PAIN DURATION: INTERMITTENT
PAIN LOCATION - RELIEVING FACTORS: REST, ICE, MEDICATION

## 2025-03-12 ENCOUNTER — HOME CARE VISIT (OUTPATIENT)
Dept: HOME HEALTH SERVICES | Facility: HOME HEALTH | Age: 60
End: 2025-03-12
Payer: COMMERCIAL

## 2025-03-12 VITALS — HEART RATE: 60 BPM | DIASTOLIC BLOOD PRESSURE: 90 MMHG | TEMPERATURE: 98.2 F | SYSTOLIC BLOOD PRESSURE: 140 MMHG

## 2025-03-12 PROCEDURE — G0151 HHCP-SERV OF PT,EA 15 MIN: HCPCS

## 2025-03-12 SDOH — HEALTH STABILITY: PHYSICAL HEALTH: EXERCISE TYPE: LE THER EX

## 2025-03-12 ASSESSMENT — ACTIVITIES OF DAILY LIVING (ADL)
AMBULATION_DISTANCE/DURATION_TOLERATED: 150 FT
CURRENT_FUNCTION: INDEPENDENT
AMBULATION ASSISTANCE ON FLAT SURFACES: 1
PHYSICAL TRANSFERS ASSESSED: 1
OASIS_M1830: 01
AMBULATION ASSISTANCE: INDEPENDENT
HOME_HEALTH_OASIS: 00
AMBULATION ASSISTANCE: 1

## 2025-03-12 ASSESSMENT — ENCOUNTER SYMPTOMS
MUSCLE WEAKNESS: 1
PAIN LOCATION - PAIN SEVERITY: 1/10
PAIN LOCATION: LEFT HIP
PERSON REPORTING PAIN: PATIENT
PAIN: 1

## 2025-03-20 DIAGNOSIS — E03.9 ACQUIRED HYPOTHYROIDISM: ICD-10-CM

## 2025-03-20 RX ORDER — LEVOTHYROXINE SODIUM 75 UG/1
75 TABLET ORAL
Qty: 90 TABLET | Refills: 0 | Status: SHIPPED | OUTPATIENT
Start: 2025-03-20

## 2025-04-19 DIAGNOSIS — J45.20 MILD INTERMITTENT ASTHMA, UNSPECIFIED WHETHER COMPLICATED (HHS-HCC): ICD-10-CM

## 2025-04-21 ENCOUNTER — PATIENT MESSAGE (OUTPATIENT)
Dept: PRIMARY CARE | Facility: CLINIC | Age: 60
End: 2025-04-21
Payer: COMMERCIAL

## 2025-04-21 DIAGNOSIS — F51.01 PRIMARY INSOMNIA: ICD-10-CM

## 2025-04-21 RX ORDER — ZOLPIDEM TARTRATE 10 MG/1
10 TABLET ORAL NIGHTLY PRN
Qty: 90 TABLET | Refills: 0 | Status: SHIPPED | OUTPATIENT
Start: 2025-04-21 | End: 2025-04-21 | Stop reason: SDUPTHER

## 2025-04-21 RX ORDER — ZOLPIDEM TARTRATE 10 MG/1
10 TABLET ORAL NIGHTLY PRN
Qty: 90 TABLET | Refills: 3 | Status: SHIPPED | OUTPATIENT
Start: 2025-04-21

## 2025-04-21 RX ORDER — ALBUTEROL SULFATE 90 UG/1
2 INHALANT RESPIRATORY (INHALATION) EVERY 4 HOURS PRN
Qty: 25.5 G | Refills: 3 | Status: SHIPPED | OUTPATIENT
Start: 2025-04-21

## 2025-04-23 LAB
ALBUMIN SERPL-MCNC: 4.4 G/DL (ref 3.6–5.1)
ALP SERPL-CCNC: 93 U/L (ref 37–153)
ALT SERPL-CCNC: 23 U/L (ref 6–29)
ANION GAP SERPL CALCULATED.4IONS-SCNC: 10 MMOL/L (CALC) (ref 7–17)
AST SERPL-CCNC: 23 U/L (ref 10–35)
BASOPHILS # BLD AUTO: 28 CELLS/UL (ref 0–200)
BASOPHILS NFR BLD AUTO: 0.5 %
BILIRUB SERPL-MCNC: 0.4 MG/DL (ref 0.2–1.2)
BUN SERPL-MCNC: 13 MG/DL (ref 7–25)
CALCIUM SERPL-MCNC: 9.5 MG/DL (ref 8.6–10.4)
CHLORIDE SERPL-SCNC: 101 MMOL/L (ref 98–110)
CHOLEST SERPL-MCNC: 222 MG/DL
CHOLEST/HDLC SERPL: 4.7 (CALC)
CO2 SERPL-SCNC: 27 MMOL/L (ref 20–32)
CREAT SERPL-MCNC: 0.64 MG/DL (ref 0.5–1.05)
EGFRCR SERPLBLD CKD-EPI 2021: 101 ML/MIN/1.73M2
EOSINOPHIL # BLD AUTO: 281 CELLS/UL (ref 15–500)
EOSINOPHIL NFR BLD AUTO: 5.1 %
ERYTHROCYTE [DISTWIDTH] IN BLOOD BY AUTOMATED COUNT: 13.5 % (ref 11–15)
EST. AVERAGE GLUCOSE BLD GHB EST-MCNC: 105 MG/DL
EST. AVERAGE GLUCOSE BLD GHB EST-SCNC: 5.8 MMOL/L
GLUCOSE SERPL-MCNC: 101 MG/DL (ref 65–99)
HBA1C MFR BLD: 5.3 %
HCT VFR BLD AUTO: 51.2 % (ref 35–45)
HDLC SERPL-MCNC: 47 MG/DL
HGB BLD-MCNC: 16.7 G/DL (ref 11.7–15.5)
LDLC SERPL CALC-MCNC: 130 MG/DL (CALC)
LYMPHOCYTES # BLD AUTO: 1881 CELLS/UL (ref 850–3900)
LYMPHOCYTES NFR BLD AUTO: 34.2 %
MCH RBC QN AUTO: 31.3 PG (ref 27–33)
MCHC RBC AUTO-ENTMCNC: 32.6 G/DL (ref 32–36)
MCV RBC AUTO: 96.1 FL (ref 80–100)
MONOCYTES # BLD AUTO: 435 CELLS/UL (ref 200–950)
MONOCYTES NFR BLD AUTO: 7.9 %
NEUTROPHILS # BLD AUTO: 2877 CELLS/UL (ref 1500–7800)
NEUTROPHILS NFR BLD AUTO: 52.3 %
NONHDLC SERPL-MCNC: 175 MG/DL (CALC)
PLATELET # BLD AUTO: 196 THOUSAND/UL (ref 140–400)
PMV BLD REES-ECKER: 11.7 FL (ref 7.5–12.5)
POTASSIUM SERPL-SCNC: 4.4 MMOL/L (ref 3.5–5.3)
PROT SERPL-MCNC: 7.3 G/DL (ref 6.1–8.1)
RBC # BLD AUTO: 5.33 MILLION/UL (ref 3.8–5.1)
SODIUM SERPL-SCNC: 138 MMOL/L (ref 135–146)
TRIGL SERPL-MCNC: 318 MG/DL
TSH SERPL-ACNC: 4.14 MIU/L (ref 0.4–4.5)
WBC # BLD AUTO: 5.5 THOUSAND/UL (ref 3.8–10.8)

## 2025-04-28 ENCOUNTER — OFFICE VISIT (OUTPATIENT)
Dept: PRIMARY CARE | Facility: CLINIC | Age: 60
End: 2025-04-28
Payer: COMMERCIAL

## 2025-04-28 VITALS
TEMPERATURE: 97.4 F | WEIGHT: 162 LBS | HEIGHT: 64 IN | OXYGEN SATURATION: 95 % | SYSTOLIC BLOOD PRESSURE: 128 MMHG | HEART RATE: 65 BPM | DIASTOLIC BLOOD PRESSURE: 60 MMHG | BODY MASS INDEX: 27.66 KG/M2

## 2025-04-28 DIAGNOSIS — J45.20 MILD INTERMITTENT ASTHMA WITHOUT COMPLICATION (HHS-HCC): ICD-10-CM

## 2025-04-28 DIAGNOSIS — Z00.00 ANNUAL PHYSICAL EXAM: Primary | ICD-10-CM

## 2025-04-28 DIAGNOSIS — D50.9 IRON DEFICIENCY ANEMIA, UNSPECIFIED IRON DEFICIENCY ANEMIA TYPE: ICD-10-CM

## 2025-04-28 DIAGNOSIS — E78.2 MIXED HYPERLIPIDEMIA: ICD-10-CM

## 2025-04-28 DIAGNOSIS — Z79.899 POLYPHARMACY: ICD-10-CM

## 2025-04-28 DIAGNOSIS — J30.89 NON-SEASONAL ALLERGIC RHINITIS DUE TO OTHER ALLERGIC TRIGGER: ICD-10-CM

## 2025-04-28 DIAGNOSIS — E03.9 ACQUIRED HYPOTHYROIDISM: ICD-10-CM

## 2025-04-28 DIAGNOSIS — E55.9 VITAMIN D DEFICIENCY: ICD-10-CM

## 2025-04-28 DIAGNOSIS — Z72.0 TOBACCO USE: ICD-10-CM

## 2025-04-28 DIAGNOSIS — Z23 ENCOUNTER FOR IMMUNIZATION: ICD-10-CM

## 2025-04-28 DIAGNOSIS — K21.9 GASTROESOPHAGEAL REFLUX DISEASE WITHOUT ESOPHAGITIS: ICD-10-CM

## 2025-04-28 DIAGNOSIS — F41.9 ANXIETY: ICD-10-CM

## 2025-04-28 DIAGNOSIS — E53.8 VITAMIN B 12 DEFICIENCY: ICD-10-CM

## 2025-04-28 DIAGNOSIS — F51.01 PRIMARY INSOMNIA: ICD-10-CM

## 2025-04-28 PROCEDURE — 90677 PCV20 VACCINE IM: CPT | Performed by: NURSE PRACTITIONER

## 2025-04-28 PROCEDURE — 90471 IMMUNIZATION ADMIN: CPT | Performed by: NURSE PRACTITIONER

## 2025-04-28 PROCEDURE — 3008F BODY MASS INDEX DOCD: CPT | Performed by: NURSE PRACTITIONER

## 2025-04-28 PROCEDURE — 99396 PREV VISIT EST AGE 40-64: CPT | Performed by: NURSE PRACTITIONER

## 2025-04-28 PROCEDURE — 4004F PT TOBACCO SCREEN RCVD TLK: CPT | Performed by: NURSE PRACTITIONER

## 2025-04-28 ASSESSMENT — ENCOUNTER SYMPTOMS
DEPRESSION: 0
HEMATURIA: 0
VOMITING: 0
FLANK PAIN: 0
COUGH: 0
BRUISES/BLEEDS EASILY: 0
LOSS OF SENSATION IN FEET: 0
CHILLS: 0
PALPITATIONS: 0
SPEECH DIFFICULTY: 0
HEADACHES: 0
SHORTNESS OF BREATH: 0
CHEST TIGHTNESS: 0
SEIZURES: 0
AGITATION: 0
FATIGUE: 0
POLYDIPSIA: 0
OCCASIONAL FEELINGS OF UNSTEADINESS: 0
ADENOPATHY: 0
DIZZINESS: 0
WOUND: 0
CONFUSION: 0
DYSURIA: 0
BACK PAIN: 0
DIAPHORESIS: 0
ABDOMINAL PAIN: 0
NAUSEA: 0
BLOOD IN STOOL: 0
NECK PAIN: 0
POLYPHAGIA: 0
FACIAL ASYMMETRY: 0
FEVER: 0

## 2025-04-28 ASSESSMENT — LIFESTYLE VARIABLES
HOW OFTEN DURING THE LAST YEAR HAVE YOU BEEN UNABLE TO REMEMBER WHAT HAPPENED THE NIGHT BEFORE BECAUSE YOU HAD BEEN DRINKING: NEVER
HOW OFTEN DURING THE LAST YEAR HAVE YOU NEEDED AN ALCOHOLIC DRINK FIRST THING IN THE MORNING TO GET YOURSELF GOING AFTER A NIGHT OF HEAVY DRINKING: NEVER
HOW OFTEN DURING THE LAST YEAR HAVE YOU FAILED TO DO WHAT WAS NORMALLY EXPECTED FROM YOU BECAUSE OF DRINKING: NEVER
SKIP TO QUESTIONS 9-10: 1
HOW OFTEN DO YOU HAVE A DRINK CONTAINING ALCOHOL: NEVER
HOW MANY STANDARD DRINKS CONTAINING ALCOHOL DO YOU HAVE ON A TYPICAL DAY: PATIENT DOES NOT DRINK
AUDIT-C TOTAL SCORE: 0
HAS A RELATIVE, FRIEND, DOCTOR, OR ANOTHER HEALTH PROFESSIONAL EXPRESSED CONCERN ABOUT YOUR DRINKING OR SUGGESTED YOU CUT DOWN: NO
HOW OFTEN DO YOU HAVE SIX OR MORE DRINKS ON ONE OCCASION: NEVER
HOW OFTEN DURING THE LAST YEAR HAVE YOU HAD A FEELING OF GUILT OR REMORSE AFTER DRINKING: NEVER
HOW OFTEN DURING THE LAST YEAR HAVE YOU FOUND THAT YOU WERE NOT ABLE TO STOP DRINKING ONCE YOU HAD STARTED: NEVER
AUDIT TOTAL SCORE: 0
HAVE YOU OR SOMEONE ELSE BEEN INJURED AS A RESULT OF YOUR DRINKING: NO

## 2025-04-28 ASSESSMENT — PAIN SCALES - GENERAL: PAINLEVEL_OUTOF10: 0-NO PAIN

## 2025-04-28 NOTE — PROGRESS NOTES
[facility]: [facility]  PHYSICAL EXAM      Sonal Tyson is a 59 y.o. female that is presenting today for cpe/csa.    Encouraged Covid 19, RSV immunizations via local pharmacy.    Advised Pneumovax immunizations today. Agreed.    Ms. Tyson reports taking levothyroxine as directed without food. Denies heat or cold intolerance, palpitations or unintentional weight changes.    Anxiety is managed with SSRI. She is not followed by Psychiatry. She is not currently involved in either individual or group therapy. Reports stable symptoms.    GERD symptoms managed with H2 Blocker. Denies epigastric pain, acid reflux, N/V, change in bowel movements or blood in stool.    She reports zolpidem effective in managing her insomnia. She takes nightly. Denies misuse.    Asthma is managed effectively with rescue inhaler. Occasional use.    Assessment/Plan     Diagnoses and all orders for this visit:    Annual physical exam        -     Routine and preventative care provided and discussed with patient     Acquired hypothyroidism        -     Clinically and chemically euthyroid        -     levothyroxine 75 mcg daily    Iron deficiency anemia, unspecified iron deficiency anemia type        -     Stable        -     continue daily OTC iron supplement    Anxiety        -     stable on SSRI        -     escitalopram 20 mg daily    Mild intermittent asthma without complication ([facility])        -     Stable        -     albuterol 90 mcg/actuation inhaler as needed    Gastroesophageal reflux disease without esophagitis        -     Stable on H2 Blocker        -     famotidine 20 mg daily    Primary insomnia        -     CSA signed today        -     Urine toxicology today        -     Zolpidem 10 mg nightly        -     Follow up in 3 months    Vitamin D deficiency       -     Continue daily OTC Vitamin D supplement     Vitamin B 12 deficiency       -     Continue daily OTC Vitamin B 12 supplement     Non-seasonal  allergic rhinitis due to other allergic trigger        -     loratadine 10 mg daily as needed    Mixed hyperlipidemia        -     Continue lifestyle modification efforts    Polypharmacy  -     Opiate/Opioid/Benzo Prescription Compliance; Future    Tobacco use  -     CT lung screening low dose; Future  -     smoking cessation encouraged    Other orders  -     Follow Up In Primary Care - Health Maintenance  -     Follow Up In Primary Care - Established; Future    Subjective   - Today's appointment is to keep the patient in compliance with their controlled substance agreement (CSA).   - The patient needs to sign a new CSA since the previous one has . Discussed with the patient that they would be provided a paper copy and that they could also find this on their MyChart. Patient voiced understanding and agreement.  - The patient has not successfully completed their urine drug screen (UDS) this year. Discussed with them that I will not be able to prescribe their controlled medication(s) until this is completed and appropriate. Patient voiced understanding and agreement.  - I have considered the risks of abuse, dependence, addiction, and/or diversion and have discussed these with the patient during our appointment.  - I do believe that the medication(s) are medically necessary. Patient has tried multiple alternatives to controlled substances in the past with limited success.  - The patient's symptoms are well-controlled on current therapy.  - PDMP reviewed and appropriate with last Zolpidem fill on 25 for 90 day supply.        Review of Systems   Constitutional:  Negative for chills, diaphoresis, fatigue and fever.   HENT:  Negative for hearing loss and mouth sores.    Eyes:  Negative for visual disturbance.   Respiratory:  Negative for cough, chest tightness and shortness of breath.    Cardiovascular:  Negative for chest pain, palpitations and leg swelling.   Gastrointestinal:  Negative for abdominal pain,  blood in stool, nausea and vomiting.   Endocrine: Negative for cold intolerance, heat intolerance, polydipsia, polyphagia and polyuria.   Genitourinary:  Negative for dysuria, flank pain and hematuria.   Musculoskeletal:  Negative for back pain and neck pain.   Skin:  Negative for rash and wound.   Allergic/Immunologic: Positive for environmental allergies (House dust). Negative for food allergies and immunocompromised state.   Neurological:  Negative for dizziness, seizures, syncope, facial asymmetry, speech difficulty and headaches.   Hematological:  Negative for adenopathy. Does not bruise/bleed easily.   Psychiatric/Behavioral:  Negative for agitation and confusion.       Objective   Vitals:    04/28/25 1408   BP: 128/60   Pulse: 65   Temp: 36.3 °C (97.4 °F)   SpO2: 95%     Body mass index is 27.81 kg/m².  Physical Exam  Vitals and nursing note reviewed.   Constitutional:       General: She is not in acute distress.     Appearance: Normal appearance. She is not ill-appearing.   HENT:      Head: Normocephalic and atraumatic.      Right Ear: Tympanic membrane, ear canal and external ear normal. There is no impacted cerumen.      Left Ear: Tympanic membrane, ear canal and external ear normal. There is no impacted cerumen.      Nose: Nose normal.      Mouth/Throat:      Mouth: Mucous membranes are moist.      Pharynx: Oropharynx is clear. No oropharyngeal exudate or posterior oropharyngeal erythema.   Eyes:      General: No scleral icterus.        Right eye: No discharge.         Left eye: No discharge.      Extraocular Movements: Extraocular movements intact.      Conjunctiva/sclera: Conjunctivae normal.      Pupils: Pupils are equal, round, and reactive to light.   Neck:      Vascular: No carotid bruit.   Cardiovascular:      Rate and Rhythm: Normal rate and regular rhythm.      Pulses: Normal pulses.      Heart sounds: Normal heart sounds. No murmur heard.  Pulmonary:      Effort: Pulmonary effort is normal. No  "respiratory distress.      Breath sounds: Normal breath sounds.   Abdominal:      General: Abdomen is flat. Bowel sounds are normal. There is no distension.      Palpations: Abdomen is soft. There is no mass.      Tenderness: There is no abdominal tenderness. There is no right CVA tenderness or left CVA tenderness.   Musculoskeletal:         General: No tenderness. Normal range of motion.      Cervical back: No tenderness.      Right lower leg: No edema.      Left lower leg: No edema.   Lymphadenopathy:      Cervical: No cervical adenopathy.   Skin:     General: Skin is warm and dry.      Coloration: Skin is not jaundiced.      Findings: No rash.   Neurological:      General: No focal deficit present.      Mental Status: She is alert and oriented to person, place, and time. Mental status is at baseline.   Psychiatric:         Mood and Affect: Mood normal.         Behavior: Behavior normal.       Diagnostic Results   Lab Results   Component Value Date    GLUCOSE 101 (H) 04/22/2025    CALCIUM 9.5 04/22/2025     04/22/2025    K 4.4 04/22/2025    CO2 27 04/22/2025     04/22/2025    BUN 13 04/22/2025    CREATININE 0.64 04/22/2025     Lab Results   Component Value Date    ALT 23 04/22/2025    AST 23 04/22/2025    ALKPHOS 93 04/22/2025    BILITOT 0.4 04/22/2025     Lab Results   Component Value Date    WBC 5.5 04/22/2025    HGB 16.7 (H) 04/22/2025    HCT 51.2 (H) 04/22/2025    MCV 96.1 04/22/2025     04/22/2025     Lab Results   Component Value Date    CHOL 222 (H) 04/22/2025    CHOL 154 05/20/2024    CHOL 192 03/14/2023     Lab Results   Component Value Date    HDL 47 (L) 04/22/2025    HDL 42.2 05/20/2024    HDL 37 (L) 03/14/2023     Lab Results   Component Value Date    LDLCALC 130 (H) 04/22/2025    LDLCALC 89 05/20/2024    LDLCALC 99 03/14/2023     Lab Results   Component Value Date    TRIG 318 (H) 04/22/2025    TRIG 116 05/20/2024    TRIG 282 (H) 03/14/2023     No components found for: \"CHOLHDL\"  Lab " Results   Component Value Date    HGBA1C 5.3 04/22/2025     Other labs not included in the list above were reviewed either before or during this encounter.    History   Medical History[1]  Surgical History[2]  Family History[3]  Social History     Socioeconomic History    Marital status:      Spouse name: Not on file    Number of children: Not on file    Years of education: Not on file    Highest education level: Not on file   Occupational History    Not on file   Tobacco Use    Smoking status: Every Day     Current packs/day: 1.00     Average packs/day: 1 pack/day for 48.3 years (48.3 ttl pk-yrs)     Types: Cigarettes     Start date: 1977    Smokeless tobacco: Never   Vaping Use    Vaping status: Never Used   Substance and Sexual Activity    Alcohol use: Not Currently    Drug use: Not Currently     Types: Marijuana     Comment: SMOKES OCCASIONAL-LAST 1 MONTH AGO. TO STOP FOR SURGERY    Sexual activity: Yes     Partners: Male     Birth control/protection: Post-menopausal   Other Topics Concern    Not on file   Social History Narrative    Not on file     Social Drivers of Health     Financial Resource Strain: Low Risk  (2/25/2025)    Overall Financial Resource Strain (CARDIA)     Difficulty of Paying Living Expenses: Not hard at all   Food Insecurity: No Food Insecurity (2/25/2025)    Hunger Vital Sign     Worried About Running Out of Food in the Last Year: Never true     Ran Out of Food in the Last Year: Never true   Transportation Needs: No Transportation Needs (3/12/2025)    OASIS : Transportation     Lack of Transportation (Medical): No     Lack of Transportation (Non-Medical): No     Patient Unable or Declines to Respond: No   Physical Activity: Inactive (2/25/2025)    Exercise Vital Sign     Days of Exercise per Week: 0 days     Minutes of Exercise per Session: 0 min   Stress: No Stress Concern Present (2/25/2025)    [employer] [place] [employer] - Occupational Stress Questionnaire      Feeling of Stress : Not at all   Social Connections: Feeling Socially Integrated (3/12/2025)    OASIS : Social Isolation     Frequency of experiencing loneliness or isolation: Never   Recent Concern: Social Connections - Moderately Isolated (2/25/2025)    Social Connection and Isolation Panel [NHANES]     Frequency of Communication with Friends and Family: More than three times a week     Frequency of Social Gatherings with Friends and Family: More than three times a week     Attends Jew Services: Never     Active Member of Clubs or Organizations: No     Attends Club or Organization Meetings: Never     Marital Status:    Intimate Partner Violence: Not At Risk (2/25/2025)    Humiliation, Afraid, Rape, and Kick questionnaire     Fear of Current or Ex-Partner: No     Emotionally Abused: No     Physically Abused: No     Sexually Abused: No   Housing Stability: Low Risk  (2/25/2025)    Housing Stability Vital Sign     Unable to Pay for Housing in the Last Year: No     Number of Times Moved in the Last Year: 0     Homeless in the Last Year: No     Allergies[4]  Medications Ordered Prior to Encounter[5]  Immunization History   Administered Date(s) Administered    Flu vaccine (IIV4), preservative free *Check age/dose* 09/30/2020    Flu vaccine, quadrivalent, no egg protein, age 6 month or greater (FLUCELVAX) 10/19/2019, 11/17/2022    Flu vaccine, trivalent, preservative free, age 6 months and greater (Fluarix/Fluzone/Flulaval) 10/03/2024    Influenza, injectable, quadrivalent 11/16/2015, 12/07/2016, 10/01/2019, 10/26/2021    Influenza, seasonal, injectable 10/05/2021, 10/31/2023    Moderna COVID-19 vaccine, bivalent, blue cap/gray label *Check age/dose* 11/17/2022    Moderna SARS-CoV-2 Vaccination 05/10/2021, 06/04/2021, 01/03/2022    Pfizer Purple Cap SARS-CoV-2 10/31/2023    Tdap vaccine, age 7 year and older (BOOSTRIX, ADACEL) 04/24/2014, 10/17/2019    Zoster vaccine, recombinant, adult (SHINGRIX)  2021     Patient's medical history was reviewed and updated either before or during this encounter.       Yvrose Doe, APRN-CNP       [1]   Past Medical History:  Diagnosis Date    Adverse effect of anesthesia     STATES OXYGEN WAS LOW AFTER LAST KNEE REPLACEMENT    Allergic     Anxiety     Arthritis     Asthma     Chronic pain disorder     Hip pain    Depression     Disease of thyroid gland     Easy bruising     GERD (gastroesophageal reflux disease)     Hypothyroid     Joint pain     Varicella    [2]   Past Surgical History:  Procedure Laterality Date     SECTION, LOW TRANSVERSE      CHOLECYSTECTOMY      JOINT REPLACEMENT      LASIK      OTHER SURGICAL HISTORY     [3]   Family History  Problem Relation Name Age of Onset    Melanoma Mother Mom     Heart disease Mother Mom     Other (Heart Stent) Mother Mom     Arthritis Mother Mom     Other (Triple Bypass) Brother  38    Melanoma Maternal Grandfather      Colon cancer Paternal Grandmother Mama    [4]   Allergies  Allergen Reactions    House Dust Cough    Penicillin Itching   [5]   Current Outpatient Medications on File Prior to Visit   Medication Sig Dispense Refill    albuterol 90 mcg/actuation inhaler INHALE TWO PUFFS BY MOUTH EVERY 4 HOURS AS NEEDED 25.5 g 3    ascorbic acid (VITAMIN C ORAL) Take 1 tablet by mouth early in the morning.. With zinc 2 tabs at bedtime        B complex-vitamin C tablet Take 1 tablet by mouth once daily.      biotin 10,000 mcg capsule Take 1 capsule (10 mg) by mouth once daily.      calcium carbonate (Tums) 200 mg calcium chewable tablet Chew 1 tablet (500 mg) 4 times a day as needed for indigestion or heartburn.      cholecalciferol (Vitamin D3) 50 mcg (2,000 unit) capsule Take 1 capsule (50 mcg) by mouth once daily at bedtime.      escitalopram (Lexapro) 20 mg tablet take 1 tablet by mouth once a day 90 tablet 3    famotidine (Pepcid) 20 mg tablet Take 1 tablet (20 mg) by mouth once daily.      ferrous sulfate  325 (65 Fe) MG EC tablet Take 1 tablet by mouth once daily. Do not crush, chew, or split.      fluticasone furoate-vilanteroL (Breo Ellipta) 200-25 mcg/dose inhaler Inhale 1 puff once daily. 3 each 3    ibuprofen 200 mg tablet Take 1 tablet (200 mg) by mouth every 6 hours if needed for mild pain (1 - 3). alternate with Tylenol      levothyroxine (Synthroid, Levoxyl) 75 mcg tablet take 1 tablet by mouth in the morning on an empty stomach 90 tablet 0    loratadine 10 mg capsule Take 1 tablet by mouth once daily.      magnesium 250 mg tablet Take 1 tablet (250 mg) by mouth once daily at bedtime.      multivitamin tablet Take 1 tablet by mouth once daily.      zolpidem (Ambien) 10 mg tablet Take 1 tablet (10 mg) by mouth as needed at bedtime for sleep. 90 tablet 3    [DISCONTINUED] folic acid (Folvite) 400 mcg tablet Take 1 tablet by mouth once daily.       No current facility-administered medications on file prior to visit.

## 2025-04-30 LAB
1OH-MIDAZOLAM UR-MCNC: NEGATIVE NG/ML
7AMINOCLONAZEPAM UR-MCNC: NEGATIVE NG/ML
A-OH ALPRAZ UR-MCNC: NEGATIVE NG/ML
A-OH-TRIAZOLAM UR-MCNC: NEGATIVE NG/ML
AMPHETAMINES UR QL: NEGATIVE NG/ML
BARBITURATES UR QL: NEGATIVE NG/ML
BZE UR QL: NEGATIVE NG/ML
CODEINE UR-MCNC: NEGATIVE NG/ML
CREAT UR-MCNC: 41.7 MG/DL
DRUG SCREEN COMMENT UR-IMP: ABNORMAL
EDDP UR-MCNC: NEGATIVE NG/ML
FENTANYL UR-MCNC: NEGATIVE NG/ML
HYDROCODONE UR-MCNC: NEGATIVE NG/ML
HYDROMORPHONE UR-MCNC: NEGATIVE NG/ML
LORAZEPAM UR-MCNC: NEGATIVE NG/ML
METHADONE UR-MCNC: NEGATIVE NG/ML
MORPHINE UR-MCNC: NEGATIVE NG/ML
NORDIAZEPAM UR-MCNC: NEGATIVE NG/ML
NORFENTANYL UR-MCNC: NEGATIVE NG/ML
NORHYDROCODONE UR CFM-MCNC: NEGATIVE NG/ML
NOROXYCODONE UR CFM-MCNC: NEGATIVE NG/ML
NORTRAMADOL UR-MCNC: NEGATIVE NG/ML
OH-ETHYLFLURAZ UR-MCNC: NEGATIVE NG/ML
OXAZEPAM UR-MCNC: NEGATIVE NG/ML
OXIDANTS UR QL: NEGATIVE MCG/ML
OXYCODONE UR CFM-MCNC: NEGATIVE NG/ML
OXYMORPHONE UR CFM-MCNC: NEGATIVE NG/ML
PCP UR QL: NEGATIVE NG/ML
PH UR: 5.5 [PH] (ref 4.5–9)
QUEST 6 ACETYLMORPHINE: NEGATIVE NG/ML
QUEST NOTES AND COMMENTS: ABNORMAL
QUEST ZOLPIDEM: NEGATIVE NG/ML
TEMAZEPAM UR-MCNC: NEGATIVE NG/ML
THC UR QL: NEGATIVE NG/ML
TRAMADOL UR-MCNC: NEGATIVE NG/ML
ZOLPIDEM PHENYL-4-CARB UR CFM-MCNC: 574 NG/ML

## 2025-05-13 ENCOUNTER — HOSPITAL ENCOUNTER (OUTPATIENT)
Dept: RADIOLOGY | Facility: HOSPITAL | Age: 60
Discharge: HOME | End: 2025-05-13
Payer: COMMERCIAL

## 2025-05-13 DIAGNOSIS — Z72.0 TOBACCO USE: ICD-10-CM

## 2025-05-13 PROCEDURE — 71271 CT THORAX LUNG CANCER SCR C-: CPT

## 2025-05-14 DIAGNOSIS — F41.9 ANXIETY: ICD-10-CM

## 2025-05-14 RX ORDER — ESCITALOPRAM OXALATE 20 MG/1
20 TABLET ORAL DAILY
Qty: 90 TABLET | Refills: 3 | Status: SHIPPED | OUTPATIENT
Start: 2025-05-14

## 2025-07-30 DIAGNOSIS — E03.9 ACQUIRED HYPOTHYROIDISM: ICD-10-CM

## 2025-07-31 RX ORDER — LEVOTHYROXINE SODIUM 75 UG/1
75 TABLET ORAL
Qty: 90 TABLET | Refills: 3 | Status: SHIPPED | OUTPATIENT
Start: 2025-07-31

## 2025-08-14 ENCOUNTER — OFFICE VISIT (OUTPATIENT)
Dept: PRIMARY CARE | Facility: CLINIC | Age: 60
End: 2025-08-14
Payer: COMMERCIAL

## 2025-08-14 VITALS
HEIGHT: 64 IN | DIASTOLIC BLOOD PRESSURE: 84 MMHG | HEART RATE: 56 BPM | SYSTOLIC BLOOD PRESSURE: 110 MMHG | BODY MASS INDEX: 27.66 KG/M2 | WEIGHT: 162 LBS | OXYGEN SATURATION: 96 % | TEMPERATURE: 97.1 F

## 2025-08-14 DIAGNOSIS — Z72.0 TOBACCO USE: ICD-10-CM

## 2025-08-14 DIAGNOSIS — Z12.31 ENCOUNTER FOR SCREENING MAMMOGRAM FOR BREAST CANCER: ICD-10-CM

## 2025-08-14 DIAGNOSIS — R20.0 NUMBNESS AND TINGLING IN RIGHT HAND: ICD-10-CM

## 2025-08-14 DIAGNOSIS — R20.2 NUMBNESS AND TINGLING IN RIGHT HAND: ICD-10-CM

## 2025-08-14 DIAGNOSIS — J45.20 MILD INTERMITTENT ASTHMA WITHOUT COMPLICATION (HHS-HCC): ICD-10-CM

## 2025-08-14 DIAGNOSIS — F51.01 PRIMARY INSOMNIA: Primary | ICD-10-CM

## 2025-08-14 PROCEDURE — 3008F BODY MASS INDEX DOCD: CPT | Performed by: NURSE PRACTITIONER

## 2025-08-14 PROCEDURE — 99214 OFFICE O/P EST MOD 30 MIN: CPT | Performed by: NURSE PRACTITIONER

## 2025-08-14 RX ORDER — MULTIVITAMIN/IRON/FOLIC ACID 18MG-0.4MG
1 TABLET ORAL DAILY
COMMUNITY

## 2025-08-14 ASSESSMENT — LIFESTYLE VARIABLES
HOW OFTEN DO YOU HAVE SIX OR MORE DRINKS ON ONE OCCASION: NEVER
HOW OFTEN DO YOU HAVE A DRINK CONTAINING ALCOHOL: NEVER
HOW OFTEN DURING THE LAST YEAR HAVE YOU FOUND THAT YOU WERE NOT ABLE TO STOP DRINKING ONCE YOU HAD STARTED: NEVER
HOW OFTEN DURING THE LAST YEAR HAVE YOU NEEDED AN ALCOHOLIC DRINK FIRST THING IN THE MORNING TO GET YOURSELF GOING AFTER A NIGHT OF HEAVY DRINKING: NEVER
AUDIT-C TOTAL SCORE: 0
HOW OFTEN DURING THE LAST YEAR HAVE YOU HAD A FEELING OF GUILT OR REMORSE AFTER DRINKING: NEVER
HAS A RELATIVE, FRIEND, DOCTOR, OR ANOTHER HEALTH PROFESSIONAL EXPRESSED CONCERN ABOUT YOUR DRINKING OR SUGGESTED YOU CUT DOWN: NO
HOW MANY STANDARD DRINKS CONTAINING ALCOHOL DO YOU HAVE ON A TYPICAL DAY: PATIENT DOES NOT DRINK
HAVE YOU OR SOMEONE ELSE BEEN INJURED AS A RESULT OF YOUR DRINKING: NO
AUDIT TOTAL SCORE: 0
HOW OFTEN DURING THE LAST YEAR HAVE YOU FAILED TO DO WHAT WAS NORMALLY EXPECTED FROM YOU BECAUSE OF DRINKING: NEVER
HOW OFTEN DURING THE LAST YEAR HAVE YOU BEEN UNABLE TO REMEMBER WHAT HAPPENED THE NIGHT BEFORE BECAUSE YOU HAD BEEN DRINKING: NEVER
SKIP TO QUESTIONS 9-10: 1

## 2025-08-14 ASSESSMENT — ENCOUNTER SYMPTOMS
OCCASIONAL FEELINGS OF UNSTEADINESS: 0
LOSS OF SENSATION IN FEET: 0
CONFUSION: 0
DEPRESSION: 0
FATIGUE: 0
NAUSEA: 1
AGITATION: 0
SLEEP DISTURBANCE: 1
FEVER: 0
VOMITING: 1

## 2025-08-14 ASSESSMENT — PATIENT HEALTH QUESTIONNAIRE - PHQ9
1. LITTLE INTEREST OR PLEASURE IN DOING THINGS: NOT AT ALL
SUM OF ALL RESPONSES TO PHQ9 QUESTIONS 1 AND 2: 0
2. FEELING DOWN, DEPRESSED OR HOPELESS: NOT AT ALL

## 2025-08-14 ASSESSMENT — PAIN SCALES - GENERAL: PAINLEVEL_OUTOF10: 0-NO PAIN

## 2025-09-04 ENCOUNTER — APPOINTMENT (OUTPATIENT)
Dept: RADIOLOGY | Facility: HOSPITAL | Age: 60
End: 2025-09-04
Payer: COMMERCIAL

## (undated) DEVICE — RETRIEVER, SUTURE, HEWSON

## (undated) DEVICE — DRAPE, SHEET, VI, W/BETADINE

## (undated) DEVICE — SYRINGE, 60 CC, IRRIGATION, BULB, CONTRO-BULB, PAPER POUCH

## (undated) DEVICE — GLOVE, SURGICAL, PROTEXIS PI , 7.5, PF, LF

## (undated) DEVICE — DRAPE, INSTRUMENT, W/POUCH, STERI DRAPE, 7 X 11 IN, DISPOSABLE, STERILE

## (undated) DEVICE — SUTURE, VICRYL, 2-0, 36 IN, CT-1, UNDYED

## (undated) DEVICE — SUTURE, MONOCRYL, 4-0, 27 IN, PS-2, UNDYED

## (undated) DEVICE — SUTURE, VICRYL, 0, 36 IN, CT-1, UNDYED

## (undated) DEVICE — Device

## (undated) DEVICE — ADHESIVE, SKIN, DERMABOND ADVANCED, 15CM, PEN-STYLE

## (undated) DEVICE — FACE SHIELD, OPTI-COM

## (undated) DEVICE — SOLUTION, IRRIGATION, X RX SODIUM CHL 0.9%, 1000ML BTL

## (undated) DEVICE — SUTURE, ETHIBOND, P2, V-37, 30 IN, GREEN

## (undated) DEVICE — MARKER, SURGICAL, SKIN, REG TIP, W/ RULER & LABELS

## (undated) DEVICE — IRRIGATION SYSTEM, WOUND, SURGIPHOR, 450ML, STERILE

## (undated) DEVICE — DRAPE, SHEET, 17 X 23 IN

## (undated) DEVICE — BLADE, SAW, SAGITTAL, 25 X 73 X 1.24MM, SS, STERILE

## (undated) DEVICE — SLEEVE, VASO PRESS, CALF GARMENT, MEDIUM, GREEN

## (undated) DEVICE — GLOVE, SURGICAL, PROTEXIS PI BLUE W/NEUTHERA, 8.0, PF, LF